# Patient Record
Sex: MALE | Race: WHITE | Employment: PART TIME | URBAN - METROPOLITAN AREA
[De-identification: names, ages, dates, MRNs, and addresses within clinical notes are randomized per-mention and may not be internally consistent; named-entity substitution may affect disease eponyms.]

---

## 2017-04-11 ENCOUNTER — TELEPHONE (OUTPATIENT)
Dept: FAMILY MEDICINE CLINIC | Age: 62
End: 2017-04-11

## 2017-06-15 ENCOUNTER — OFFICE VISIT (OUTPATIENT)
Dept: FAMILY MEDICINE CLINIC | Age: 62
End: 2017-06-15

## 2017-06-15 VITALS
HEART RATE: 76 BPM | SYSTOLIC BLOOD PRESSURE: 120 MMHG | DIASTOLIC BLOOD PRESSURE: 62 MMHG | HEIGHT: 73 IN | WEIGHT: 222.13 LBS | BODY MASS INDEX: 29.44 KG/M2 | RESPIRATION RATE: 16 BRPM

## 2017-06-15 DIAGNOSIS — F41.9 ANXIETY: Primary | ICD-10-CM

## 2017-06-15 DIAGNOSIS — H61.23 BILATERAL IMPACTED CERUMEN: ICD-10-CM

## 2017-06-15 PROCEDURE — 99213 OFFICE O/P EST LOW 20 MIN: CPT | Performed by: FAMILY MEDICINE

## 2017-06-15 PROCEDURE — 69210 REMOVE IMPACTED EAR WAX UNI: CPT | Performed by: FAMILY MEDICINE

## 2017-06-15 RX ORDER — TRAZODONE HYDROCHLORIDE 50 MG/1
50 TABLET ORAL NIGHTLY
Qty: 20 TABLET | Refills: 1 | Status: SHIPPED | OUTPATIENT
Start: 2017-06-15 | End: 2017-06-19 | Stop reason: SDUPTHER

## 2017-06-15 ASSESSMENT — ENCOUNTER SYMPTOMS
SHORTNESS OF BREATH: 0
CONSTIPATION: 0
SINUS PRESSURE: 0

## 2017-06-19 ENCOUNTER — TELEPHONE (OUTPATIENT)
Dept: FAMILY MEDICINE CLINIC | Age: 62
End: 2017-06-19

## 2017-06-19 DIAGNOSIS — F41.9 ANXIETY: ICD-10-CM

## 2017-06-19 RX ORDER — TRAZODONE HYDROCHLORIDE 50 MG/1
100 TABLET ORAL NIGHTLY
COMMUNITY
Start: 2017-06-19 | End: 2017-07-05 | Stop reason: SDUPTHER

## 2017-06-26 ENCOUNTER — TELEPHONE (OUTPATIENT)
Dept: FAMILY MEDICINE CLINIC | Age: 62
End: 2017-06-26

## 2017-07-05 ENCOUNTER — TELEPHONE (OUTPATIENT)
Dept: FAMILY MEDICINE CLINIC | Age: 62
End: 2017-07-05

## 2017-07-05 DIAGNOSIS — F41.9 ANXIETY: ICD-10-CM

## 2017-07-05 RX ORDER — TRAZODONE HYDROCHLORIDE 100 MG/1
100 TABLET ORAL NIGHTLY
Qty: 90 TABLET | Refills: 3 | Status: SHIPPED | OUTPATIENT
Start: 2017-07-05 | End: 2018-06-01 | Stop reason: SDUPTHER

## 2017-10-16 ENCOUNTER — TELEPHONE (OUTPATIENT)
Dept: FAMILY MEDICINE CLINIC | Age: 62
End: 2017-10-16

## 2018-06-01 ENCOUNTER — OFFICE VISIT (OUTPATIENT)
Dept: FAMILY MEDICINE CLINIC | Age: 63
End: 2018-06-01

## 2018-06-01 VITALS
HEIGHT: 73 IN | WEIGHT: 214.25 LBS | SYSTOLIC BLOOD PRESSURE: 108 MMHG | BODY MASS INDEX: 28.39 KG/M2 | HEART RATE: 80 BPM | RESPIRATION RATE: 14 BRPM | DIASTOLIC BLOOD PRESSURE: 70 MMHG

## 2018-06-01 DIAGNOSIS — Z12.5 SCREENING FOR MALIGNANT NEOPLASM OF PROSTATE: ICD-10-CM

## 2018-06-01 DIAGNOSIS — C85.90 NON-HODGKIN'S LYMPHOMA, UNSPECIFIED BODY REGION, UNSPECIFIED NON-HODGKIN LYMPHOMA TYPE (HCC): ICD-10-CM

## 2018-06-01 DIAGNOSIS — H61.23 BILATERAL IMPACTED CERUMEN: ICD-10-CM

## 2018-06-01 DIAGNOSIS — F41.9 ANXIETY: ICD-10-CM

## 2018-06-01 DIAGNOSIS — Z00.00 WELL ADULT EXAM: Primary | ICD-10-CM

## 2018-06-01 DIAGNOSIS — J06.9 UPPER RESPIRATORY TRACT INFECTION, UNSPECIFIED TYPE: ICD-10-CM

## 2018-06-01 DIAGNOSIS — Z12.11 SCREEN FOR COLON CANCER: ICD-10-CM

## 2018-06-01 PROCEDURE — 69210 REMOVE IMPACTED EAR WAX UNI: CPT | Performed by: FAMILY MEDICINE

## 2018-06-01 PROCEDURE — 99396 PREV VISIT EST AGE 40-64: CPT | Performed by: FAMILY MEDICINE

## 2018-06-01 RX ORDER — TRAZODONE HYDROCHLORIDE 100 MG/1
100 TABLET ORAL NIGHTLY
Qty: 90 TABLET | Refills: 3 | Status: SHIPPED | OUTPATIENT
Start: 2018-06-01 | End: 2019-06-11 | Stop reason: SDUPTHER

## 2018-06-01 RX ORDER — AMOXICILLIN AND CLAVULANATE POTASSIUM 875; 125 MG/1; MG/1
1 TABLET, FILM COATED ORAL 2 TIMES DAILY
Qty: 42 TABLET | Refills: 0 | Status: SHIPPED | OUTPATIENT
Start: 2018-06-01 | End: 2018-06-01 | Stop reason: SDUPTHER

## 2018-06-01 RX ORDER — AMOXICILLIN AND CLAVULANATE POTASSIUM 875; 125 MG/1; MG/1
1 TABLET, FILM COATED ORAL 2 TIMES DAILY
Qty: 42 TABLET | Refills: 0 | Status: SHIPPED | OUTPATIENT
Start: 2018-06-01 | End: 2018-06-22

## 2018-06-01 ASSESSMENT — ENCOUNTER SYMPTOMS
SINUS PRESSURE: 0
BACK PAIN: 1
COUGH: 1
CONSTIPATION: 0
SHORTNESS OF BREATH: 0

## 2018-06-01 ASSESSMENT — PATIENT HEALTH QUESTIONNAIRE - PHQ9
SUM OF ALL RESPONSES TO PHQ QUESTIONS 1-9: 0
1. LITTLE INTEREST OR PLEASURE IN DOING THINGS: 0
2. FEELING DOWN, DEPRESSED OR HOPELESS: 0
SUM OF ALL RESPONSES TO PHQ9 QUESTIONS 1 & 2: 0

## 2018-06-09 LAB
ABSOLUTE BASO #: 0 K/UL (ref 0–0.1)
ABSOLUTE EOS #: 0.1 K/UL (ref 0.1–0.4)
ABSOLUTE LYMPH #: 1.5 K/UL (ref 0.8–5.2)
ABSOLUTE MONO #: 0.5 K/UL (ref 0.1–0.9)
ABSOLUTE NEUT #: 3.1 K/UL (ref 1.3–9.1)
ALBUMIN SERPL-MCNC: 4.5 G/DL (ref 3.5–5.2)
ALK PHOSPHATASE: 61 U/L (ref 39–118)
ALT SERPL-CCNC: 33 U/L (ref 5–50)
ANION GAP SERPL CALCULATED.3IONS-SCNC: 11 MEQ/L (ref 10–19)
AST SERPL-CCNC: 29 U/L (ref 9–50)
BASOPHILS RELATIVE PERCENT: 0.8 %
BILIRUB SERPL-MCNC: 0.7 MG/DL
BUN BLDV-MCNC: 14 MG/DL (ref 8–23)
CALCIUM SERPL-MCNC: 9.2 MG/DL (ref 8.5–10.5)
CHLORIDE BLD-SCNC: 103 MEQ/L (ref 95–107)
CHOLESTEROL/HDL RATIO: 1.7
CHOLESTEROL: 100 MG/DL
CO2: 27 MEQ/L (ref 19–31)
CREAT SERPL-MCNC: 0.9 MG/DL (ref 0.8–1.4)
EGFR AFRICAN AMERICAN: 107.2 ML/MIN/1.73 M2
EGFR IF NONAFRICAN AMERICAN: 92.5 ML/MIN/1.73 M2
EOSINOPHILS RELATIVE PERCENT: 2.1 %
GLUCOSE: 104 MG/DL (ref 70–99)
HCT VFR BLD CALC: 40.5 % (ref 41.4–51)
HDLC SERPL-MCNC: 59 MG/DL
HEMOGLOBIN: 14.1 G/DL (ref 13.8–17)
LDL CHOLESTEROL CALCULATED: 36 MG/DL
LDL/HDL RATIO: 0.6
LYMPHOCYTE %: 27.7 %
MCH RBC QN AUTO: 31.6 PG (ref 27–34)
MCHC RBC AUTO-ENTMCNC: 34.8 G/DL (ref 31–36)
MCV RBC AUTO: 90.8 FL (ref 80–100)
MONOCYTES # BLD: 10 %
NEUTROPHILS RELATIVE PERCENT: 59 %
PDW BLD-RTO: 12.6 % (ref 10.8–14.8)
PLATELETS: 146 K/UL (ref 150–450)
POTASSIUM SERPL-SCNC: 4.1 MEQ/L (ref 3.5–5.4)
PSA, ULTRASENSITIVE: 0.77 NG/ML
RBC: 4.46 M/UL (ref 4–5.5)
SODIUM BLD-SCNC: 141 MEQ/L (ref 135–146)
TOTAL PROTEIN: 6.8 G/DL (ref 6.1–8.3)
TRIGL SERPL-MCNC: 25 MG/DL
VLDLC SERPL CALC-MCNC: 5 MG/DL
WBC: 5.3 K/UL (ref 3.7–10.8)

## 2018-06-12 ENCOUNTER — TELEPHONE (OUTPATIENT)
Dept: FAMILY MEDICINE CLINIC | Age: 63
End: 2018-06-12

## 2018-07-31 ENCOUNTER — OFFICE VISIT (OUTPATIENT)
Dept: FAMILY MEDICINE CLINIC | Age: 63
End: 2018-07-31

## 2018-07-31 VITALS
WEIGHT: 220.38 LBS | SYSTOLIC BLOOD PRESSURE: 118 MMHG | RESPIRATION RATE: 14 BRPM | HEART RATE: 84 BPM | DIASTOLIC BLOOD PRESSURE: 64 MMHG | BODY MASS INDEX: 29.21 KG/M2 | HEIGHT: 73 IN

## 2018-07-31 DIAGNOSIS — J06.9 UPPER RESPIRATORY TRACT INFECTION, UNSPECIFIED TYPE: Primary | ICD-10-CM

## 2018-07-31 DIAGNOSIS — L98.9 SCALP LESION: ICD-10-CM

## 2018-07-31 PROCEDURE — 99213 OFFICE O/P EST LOW 20 MIN: CPT | Performed by: FAMILY MEDICINE

## 2018-07-31 RX ORDER — SULFAMETHOXAZOLE AND TRIMETHOPRIM 800; 160 MG/1; MG/1
1 TABLET ORAL 2 TIMES DAILY
Qty: 28 TABLET | Refills: 0 | Status: SHIPPED | OUTPATIENT
Start: 2018-07-31 | End: 2018-08-14

## 2018-07-31 ASSESSMENT — ENCOUNTER SYMPTOMS
COUGH: 1
SHORTNESS OF BREATH: 0
CONSTIPATION: 0
SINUS PRESSURE: 0

## 2018-08-03 NOTE — PROGRESS NOTES
Pt sched to see Dr Monica Brown 8/8/18 at 7 am faxed last ov to them at [phone]
dentist  Use some flonase over the counter

## 2018-12-21 ENCOUNTER — OFFICE VISIT (OUTPATIENT)
Dept: FAMILY MEDICINE CLINIC | Age: 63
End: 2018-12-21

## 2018-12-21 VITALS
RESPIRATION RATE: 12 BRPM | BODY MASS INDEX: 29.09 KG/M2 | WEIGHT: 219.5 LBS | HEART RATE: 60 BPM | HEIGHT: 73 IN | DIASTOLIC BLOOD PRESSURE: 62 MMHG | SYSTOLIC BLOOD PRESSURE: 104 MMHG

## 2018-12-21 DIAGNOSIS — J06.9 UPPER RESPIRATORY TRACT INFECTION, UNSPECIFIED TYPE: Primary | ICD-10-CM

## 2018-12-21 DIAGNOSIS — H61.23 BILATERAL IMPACTED CERUMEN: ICD-10-CM

## 2018-12-21 PROCEDURE — 99213 OFFICE O/P EST LOW 20 MIN: CPT | Performed by: FAMILY MEDICINE

## 2018-12-21 PROCEDURE — 69210 REMOVE IMPACTED EAR WAX UNI: CPT | Performed by: FAMILY MEDICINE

## 2018-12-21 RX ORDER — AZITHROMYCIN 250 MG/1
TABLET, FILM COATED ORAL
Qty: 1 PACKET | Refills: 0 | Status: SHIPPED | OUTPATIENT
Start: 2018-12-21 | End: 2018-12-31

## 2018-12-21 RX ORDER — GUAIFENESIN AND CODEINE PHOSPHATE 100; 10 MG/5ML; MG/5ML
10 SOLUTION ORAL 4 TIMES DAILY PRN
Qty: 150 ML | Refills: 0 | Status: SHIPPED | OUTPATIENT
Start: 2018-12-21 | End: 2018-12-26

## 2019-01-01 ASSESSMENT — ENCOUNTER SYMPTOMS
SHORTNESS OF BREATH: 0
SINUS PRESSURE: 0
CONSTIPATION: 0

## 2019-02-01 ENCOUNTER — HOSPITAL ENCOUNTER (EMERGENCY)
Age: 64
Discharge: HOME | End: 2019-02-01
Attending: EMERGENCY MEDICINE
Payer: COMMERCIAL

## 2019-02-01 ENCOUNTER — APPOINTMENT (OUTPATIENT)
Dept: GENERAL RADIOLOGY | Age: 64
End: 2019-02-01
Payer: COMMERCIAL

## 2019-02-01 VITALS
TEMPERATURE: 97.9 F | SYSTOLIC BLOOD PRESSURE: 139 MMHG | DIASTOLIC BLOOD PRESSURE: 81 MMHG | HEART RATE: 81 BPM | OXYGEN SATURATION: 99 %

## 2019-02-01 DIAGNOSIS — S86.911A STRAIN OF RIGHT KNEE, INITIAL ENCOUNTER: ICD-10-CM

## 2019-02-01 DIAGNOSIS — S86.912A STRAIN OF KNEE, LEFT, INITIAL ENCOUNTER: ICD-10-CM

## 2019-02-01 DIAGNOSIS — S39.012A LUMBAR STRAIN, INITIAL ENCOUNTER: Primary | ICD-10-CM

## 2019-02-01 DIAGNOSIS — M47.896 OTHER OSTEOARTHRITIS OF SPINE, LUMBAR REGION: ICD-10-CM

## 2019-02-01 PROCEDURE — 72100 X-RAY EXAM L-S SPINE 2/3 VWS: CPT

## 2019-02-01 PROCEDURE — 73564 X-RAY EXAM KNEE 4 OR MORE: CPT

## 2019-02-01 PROCEDURE — 6370000000 HC RX 637 (ALT 250 FOR IP): Performed by: EMERGENCY MEDICINE

## 2019-02-01 PROCEDURE — 99284 EMERGENCY DEPT VISIT MOD MDM: CPT

## 2019-02-01 PROCEDURE — 96372 THER/PROPH/DIAG INJ SC/IM: CPT

## 2019-02-01 PROCEDURE — 6360000002 HC RX W HCPCS: Performed by: EMERGENCY MEDICINE

## 2019-02-01 RX ORDER — CYCLOBENZAPRINE HCL 10 MG
10 TABLET ORAL 3 TIMES DAILY PRN
Qty: 21 TABLET | Refills: 0 | Status: SHIPPED | OUTPATIENT
Start: 2019-02-01 | End: 2019-02-08

## 2019-02-01 RX ORDER — OXYCODONE HYDROCHLORIDE AND ACETAMINOPHEN 5; 325 MG/1; MG/1
1 TABLET ORAL EVERY 6 HOURS PRN
Qty: 20 TABLET | Refills: 0 | Status: SHIPPED | OUTPATIENT
Start: 2019-02-01 | End: 2019-02-06

## 2019-02-01 RX ORDER — PREDNISONE 10 MG/1
TABLET ORAL
Qty: 16 TABLET | Refills: 0 | Status: SHIPPED | OUTPATIENT
Start: 2019-02-01 | End: 2019-06-19 | Stop reason: ALTCHOICE

## 2019-02-01 RX ORDER — KETOROLAC TROMETHAMINE 30 MG/ML
30 INJECTION, SOLUTION INTRAMUSCULAR; INTRAVENOUS ONCE
Status: COMPLETED | OUTPATIENT
Start: 2019-02-01 | End: 2019-02-01

## 2019-02-01 RX ORDER — CYCLOBENZAPRINE HCL 10 MG
10 TABLET ORAL ONCE
Status: COMPLETED | OUTPATIENT
Start: 2019-02-01 | End: 2019-02-01

## 2019-02-01 RX ADMIN — KETOROLAC TROMETHAMINE 30 MG: 30 INJECTION, SOLUTION INTRAMUSCULAR at 18:01

## 2019-02-01 RX ADMIN — HYDROMORPHONE HYDROCHLORIDE 1 MG: 1 INJECTION, SOLUTION INTRAMUSCULAR; INTRAVENOUS; SUBCUTANEOUS at 19:52

## 2019-02-01 RX ADMIN — CYCLOBENZAPRINE HYDROCHLORIDE 10 MG: 10 TABLET, FILM COATED ORAL at 18:01

## 2019-02-01 ASSESSMENT — ENCOUNTER SYMPTOMS
EYE REDNESS: 0
BACK PAIN: 1
SORE THROAT: 0
COUGH: 0
SHORTNESS OF BREATH: 0
EYE DISCHARGE: 0
ABDOMINAL PAIN: 0
NAUSEA: 0
DIARRHEA: 0
WHEEZING: 0
RHINORRHEA: 0
VOMITING: 0

## 2019-02-01 ASSESSMENT — PAIN SCALES - GENERAL
PAINLEVEL_OUTOF10: 8

## 2019-02-01 ASSESSMENT — PAIN DESCRIPTION - PAIN TYPE: TYPE: ACUTE PAIN

## 2019-02-01 ASSESSMENT — PAIN DESCRIPTION - LOCATION: LOCATION: BACK

## 2019-02-01 ASSESSMENT — PAIN DESCRIPTION - DESCRIPTORS: DESCRIPTORS: ACHING

## 2019-02-01 ASSESSMENT — PAIN DESCRIPTION - FREQUENCY: FREQUENCY: CONTINUOUS

## 2019-02-05 ENCOUNTER — HOSPITAL ENCOUNTER (OUTPATIENT)
Age: 64
Discharge: HOME | End: 2019-02-05

## 2019-03-29 ENCOUNTER — TELEPHONE (OUTPATIENT)
Dept: FAMILY MEDICINE CLINIC | Age: 64
End: 2019-03-29

## 2019-03-29 NOTE — TELEPHONE ENCOUNTER
Pt called stating he had called [address] regarding his CPAP machine. It is malfunctioning and obsolete. [facility] told him that he needed a RX from his PCP to be able to get a new one. He is asking for this ASAP as it does help him sleep.      [address] [facility]

## 2019-04-01 NOTE — TELEPHONE ENCOUNTER
I looked at the order for CPAP in the system and it asks for several settings of his device. It would be best if he got the order from the sleep doctor himself because over time the settings may need to be adjusted.

## 2019-06-11 DIAGNOSIS — F41.9 ANXIETY: ICD-10-CM

## 2019-06-11 RX ORDER — TRAZODONE HYDROCHLORIDE 100 MG/1
100 TABLET ORAL NIGHTLY
Qty: 90 TABLET | Refills: 3 | Status: SHIPPED | OUTPATIENT
Start: 2019-06-11 | End: 2020-05-28 | Stop reason: SDUPTHER

## 2019-06-11 NOTE — TELEPHONE ENCOUNTER
Prasanth Alves called requesting a refill of the below medication which has been pended for you:     Requested Prescriptions     Pending Prescriptions Disp Refills    traZODone (DESYREL) 100 MG tablet 90 tablet 3     Sig: Take 1 tablet by mouth nightly       Last Appointment Date: 12/21/2018  Next Appointment Date: 6/19/2019    Allergies   Allergen Reactions    Haldol [Haloperidol Lactate]      Made him very restless.     Vicodin [Hydrocodone-Acetaminophen] Rash

## 2019-06-19 ENCOUNTER — OFFICE VISIT (OUTPATIENT)
Dept: FAMILY MEDICINE CLINIC | Age: 64
End: 2019-06-19

## 2019-06-19 VITALS
DIASTOLIC BLOOD PRESSURE: 68 MMHG | HEIGHT: 73 IN | RESPIRATION RATE: 16 BRPM | BODY MASS INDEX: 29.22 KG/M2 | HEART RATE: 78 BPM | WEIGHT: 220.5 LBS | SYSTOLIC BLOOD PRESSURE: 128 MMHG

## 2019-06-19 DIAGNOSIS — Z12.11 SCREEN FOR COLON CANCER: ICD-10-CM

## 2019-06-19 DIAGNOSIS — Z00.00 WELL ADULT EXAM: Primary | ICD-10-CM

## 2019-06-19 DIAGNOSIS — G47.33 OSA (OBSTRUCTIVE SLEEP APNEA): ICD-10-CM

## 2019-06-19 DIAGNOSIS — C85.90 NON-HODGKIN'S LYMPHOMA, UNSPECIFIED BODY REGION, UNSPECIFIED NON-HODGKIN LYMPHOMA TYPE (HCC): ICD-10-CM

## 2019-06-19 DIAGNOSIS — Z12.5 SCREENING FOR MALIGNANT NEOPLASM OF PROSTATE: ICD-10-CM

## 2019-06-19 DIAGNOSIS — E01.0 THYROMEGALY: ICD-10-CM

## 2019-06-19 LAB
HEMOCCULT STL QL: NORMAL

## 2019-06-19 PROCEDURE — 82270 OCCULT BLOOD FECES: CPT | Performed by: FAMILY MEDICINE

## 2019-06-19 PROCEDURE — 99396 PREV VISIT EST AGE 40-64: CPT | Performed by: FAMILY MEDICINE

## 2019-06-19 RX ORDER — SODIUM FLUORIDE 6 MG/ML
PASTE, DENTIFRICE DENTAL
Refills: 4 | COMMUNITY
Start: 2019-06-07

## 2019-06-19 RX ORDER — 1.1% SODIUM FLUORIDE PRESCRIPTION DENTAL CREAM 5 MG/G
CREAM DENTAL
Refills: 5 | COMMUNITY
Start: 2019-03-25 | End: 2021-04-05

## 2019-06-19 ASSESSMENT — ENCOUNTER SYMPTOMS
BACK PAIN: 1
SHORTNESS OF BREATH: 0
SINUS PRESSURE: 0
RHINORRHEA: 1
CONSTIPATION: 0

## 2019-06-19 ASSESSMENT — PATIENT HEALTH QUESTIONNAIRE - PHQ9
1. LITTLE INTEREST OR PLEASURE IN DOING THINGS: 0
SUM OF ALL RESPONSES TO PHQ QUESTIONS 1-9: 0
SUM OF ALL RESPONSES TO PHQ QUESTIONS 1-9: 0
2. FEELING DOWN, DEPRESSED OR HOPELESS: 0
SUM OF ALL RESPONSES TO PHQ9 QUESTIONS 1 & 2: 0

## 2019-06-19 NOTE — PATIENT INSTRUCTIONS
Get the thyroid ultrasound done  Do the fasting labs soon  Continue the flonase and the other OTC product  See me in a year

## 2019-06-19 NOTE — PROGRESS NOTES
pulses. Pulmonary/Chest: Effort normal and breath sounds normal.   Abdominal: Soft. Bowel sounds are normal. He exhibits no mass. Hernia confirmed negative in the right inguinal area and confirmed negative in the left inguinal area. Genitourinary: Rectum normal, prostate normal, testes normal and penis normal. Rectal exam shows guaiac negative stool. Circumcised. Musculoskeletal: He exhibits no edema. Lymphadenopathy:     He has no cervical adenopathy. Neurological: He is alert and oriented to person, place, and time. Skin: Skin is warm and dry. Psychiatric: He has a normal mood and affect. His behavior is normal.   Blood pressure 128/68, pulse 78, resp. rate 16, height 6' 1\" (1.854 m), weight 220 lb 8 oz (100 kg). Results for orders placed or performed in visit on 06/19/19   POCT occult blood stool   Result Value Ref Range    OCCULT BLOOD FECAL      OCCULT BLOOD FECAL      OCCULT BLOOD FECAL       negative  Assessment:       Diagnosis Orders   1. Well adult exam  Lipid, Fasting    Comprehensive Metabolic Panel, Fasting   2. DAHIANA (obstructive sleep apnea)  [facility][note 3][facility]   3. Screening for malignant neoplasm of prostate  Psa screening   4. Non-Hodgkin's lymphoma, unspecified body region, unspecified non-Hodgkin lymphoma type ([facility] [address]  CBC Auto Differential   5. Screen for colon cancer  POCT occult blood stool   6.  Thyromegaly  US THYROID    TSH    T4, Free    T3, Free           Plan:      Get the thyroid ultrasound done  Do the fasting labs soon  Continue the flonase and the other OTC product  See me in a year        Effie Motley MD

## 2019-06-20 ENCOUNTER — TELEPHONE (OUTPATIENT)
Dept: FAMILY MEDICINE CLINIC | Age: 64
End: 2019-06-20

## 2019-07-07 LAB
ABSOLUTE BASO #: 0 K/UL (ref 0–0.1)
ABSOLUTE EOS #: 0.1 K/UL (ref 0.1–0.4)
ABSOLUTE LYMPH #: 1.5 K/UL (ref 0.8–5.2)
ABSOLUTE MONO #: 0.6 K/UL (ref 0.1–0.9)
ABSOLUTE NEUT #: 3.5 K/UL (ref 1.3–9.1)
ALBUMIN SERPL-MCNC: 4.1 G/DL (ref 3.2–5.3)
ALK PHOSPHATASE: 56 U/L (ref 39–130)
ALT SERPL-CCNC: 35 U/L (ref 0–40)
ANION GAP SERPL CALCULATED.3IONS-SCNC: 7 MMOL/L (ref 4–12)
AST SERPL-CCNC: 27 U/L (ref 0–41)
BASOPHILS RELATIVE PERCENT: 0.7 %
BILIRUB SERPL-MCNC: 0.7 MG/DL (ref 0.3–1.2)
BUN BLDV-MCNC: 17 MG/DL (ref 5–27)
CALCIUM SERPL-MCNC: 9 MG/DL (ref 8.5–10.5)
CHLORIDE BLD-SCNC: 107 MMOL/L (ref 98–109)
CHOLESTEROL/HDL RATIO: 1.8 (ref 1–5)
CHOLESTEROL: 91 MG/DL (ref 150–200)
CO2: 28 MMOL/L (ref 22–32)
CREAT SERPL-MCNC: 0.87 MG/DL (ref 0.6–1.3)
EGFR AFRICAN AMERICAN: >60 ML/MIN/1.73SQ.M
EGFR IF NONAFRICAN AMERICAN: >60 ML/MIN/1.73SQ.M
EOSINOPHILS RELATIVE PERCENT: 1.8 %
GLUCOSE: 106 MG/DL (ref 65–99)
HCT VFR BLD CALC: 38.9 % (ref 41.4–51)
HDLC SERPL-MCNC: 51 MG/DL
HEMOGLOBIN: 13.6 G/DL (ref 13.8–17)
LDL CHOLESTEROL CALCULATED: 32 MG/DL
LDL/HDL RATIO: 0.6
LYMPHOCYTE %: 26.8 %
MCH RBC QN AUTO: 32.2 PG (ref 27–34)
MCHC RBC AUTO-ENTMCNC: 35 G/DL (ref 31–36)
MCV RBC AUTO: 92.2 FL (ref 80–100)
MONOCYTES # BLD: 10.2 %
NEUTROPHILS RELATIVE PERCENT: 60.3 %
PDW BLD-RTO: 12.2 % (ref 10.8–14.8)
PLATELETS: 133 K/UL (ref 150–450)
POTASSIUM SERPL-SCNC: 3.8 MMOL/L (ref 3.5–5)
PSA, ULTRASENSITIVE: 0.74 NG/ML (ref 0–4)
RBC: 4.22 M/UL (ref 4–5.5)
SODIUM BLD-SCNC: 142 MMOL/L (ref 134–146)
T3 FREE: 3.87 PG/ML (ref 2.5–3.9)
T4 FREE: 0.88 NG/DL (ref 0.61–1.6)
TOTAL PROTEIN: 6.5 G/DL (ref 6–8)
TRIGL SERPL-MCNC: 38 MG/DL (ref 27–150)
TSH SERPL DL<=0.05 MIU/L-ACNC: 0.99 UIU/ML (ref 0.49–4.67)
VLDLC SERPL CALC-MCNC: 8 MG/DL (ref 0–30)
WBC: 5.7 K/UL (ref 3.7–10.8)

## 2019-07-08 ENCOUNTER — TELEPHONE (OUTPATIENT)
Dept: FAMILY MEDICINE CLINIC | Age: 64
End: 2019-07-08

## 2019-07-08 NOTE — TELEPHONE ENCOUNTER
----- Message from Drea Campos MD sent at 7/7/2019  3:11 PM EDT -----  Let him know the labs looked well and check them again in a year.

## 2019-07-15 ENCOUNTER — HOSPITAL ENCOUNTER (OUTPATIENT)
Dept: ULTRASOUND IMAGING | Age: 64
Discharge: HOME | End: 2019-07-15
Payer: COMMERCIAL

## 2019-07-15 DIAGNOSIS — E01.0 THYROMEGALY: ICD-10-CM

## 2019-07-15 PROCEDURE — 76536 US EXAM OF HEAD AND NECK: CPT

## 2019-07-16 ENCOUNTER — TELEPHONE (OUTPATIENT)
Dept: FAMILY MEDICINE CLINIC | Age: 64
End: 2019-07-16

## 2019-07-16 DIAGNOSIS — E04.2 MULTINODULAR GOITER: Primary | ICD-10-CM

## 2019-08-06 ENCOUNTER — INITIAL CONSULT (OUTPATIENT)
Dept: PULMONOLOGY | Age: 64
End: 2019-08-06
Payer: MEDICARE

## 2019-08-06 ENCOUNTER — TELEPHONE (OUTPATIENT)
Dept: PULMONOLOGY | Age: 64
End: 2019-08-06

## 2019-08-06 VITALS
SYSTOLIC BLOOD PRESSURE: 137 MMHG | DIASTOLIC BLOOD PRESSURE: 85 MMHG | HEART RATE: 76 BPM | HEIGHT: 73 IN | BODY MASS INDEX: 28.86 KG/M2 | WEIGHT: 217.8 LBS | OXYGEN SATURATION: 97 %

## 2019-08-06 DIAGNOSIS — G47.10 HYPERSOMNIA: ICD-10-CM

## 2019-08-06 DIAGNOSIS — G47.9 SLEEP DISORDER: Primary | ICD-10-CM

## 2019-08-06 DIAGNOSIS — G47.33 OSA (OBSTRUCTIVE SLEEP APNEA): Primary | ICD-10-CM

## 2019-08-06 PROCEDURE — 99203 OFFICE O/P NEW LOW 30 MIN: CPT | Performed by: INTERNAL MEDICINE

## 2019-08-06 RX ORDER — ZOLPIDEM TARTRATE 5 MG/1
TABLET ORAL
Qty: 1 TABLET | Refills: 0 | Status: SHIPPED | OUTPATIENT
Start: 2019-08-06 | End: 2019-09-06

## 2019-08-06 NOTE — PROGRESS NOTES
1.1 % PSTE USE SMALL AMOUNT ONCE DAILY AS A TOOTHPASTE  4    traZODone (DESYREL) 100 MG tablet Take 1 tablet by mouth nightly 90 tablet 3    therapeutic multivitamin-minerals (THERAGRAN-M) tablet Take 1 tablet by mouth daily.  SF 5000 PLUS 1.1 % CREA   5    Olive Leaf Extract 500 MG CAPS Take  by mouth. No current facility-administered medications for this visit. Family History   Problem Relation Age of Onset    Cancer Mother         bladder    Heart Disease Mother     Stroke Mother         Any family history of any sleep problems or any one in your family on CPAP? Yes    Caffeine Intake: How much soda (pop), coffee, tea, power drinks do you ingest per day?0 per day. 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 20-25 h a week, no calls      Physical Exam:    HEIGHTHeight: 6' 1\" (185.4 cm) WEIGHTWeight: 217 lb 12.8 oz (98.8 kg)    BMI:  Body mass index is 28.74 kg/m². Neck Size: 18  Oxygen Sat: room air    ESS: 10   Vitals: /85 (Site: Right Upper Arm, Position: Sitting, Cuff Size: Large Adult)   Pulse 76   Ht 6' 1\" (1.854 m)   Wt 217 lb 12.8 oz (98.8 kg)   SpO2 97% Comment: on RA  BMI 28.74 kg/m²       Mallampati Score: 2    General appearance: alert and oriented to person, place and time, well-developed and well-nourished, in no acute distress  Nose: Nares normal. Septum midline. Mucosa normal. No drainage or sinus tenderness.   Oropharynx:  Mallampati class 2  Lungs: clear to auscultation bilaterally  Heart: regular rate and rhythm, S1, S2 normal, no murmur, click, rub or gallop  Extremities: extremities normal, atraumatic, no cyanosis or edema  Neurologic: Mental status: Alert, oriented, thought content appropriate      Assessment      Obstructive sleep apnea on obsolete non downloadable Bipap 14/10, needs a new device  Uses device every night but remains symptomatic with ESS 10 cwp results last

## 2019-08-06 NOTE — TELEPHONE ENCOUNTER
Patient stated when he had his last sleep study he had to be given something to help him sleep. He is requesting something to take again on the night of the sleep study.

## 2019-08-27 NOTE — TELEPHONE ENCOUNTER
Pt called said that he called Dr. Debbie Childers' office and he was told that they did not have the referral or the medical records. He gave me the same fax # of [phone] so I faxed the referral, the office visit from 06/19/19, the ultra sound result as well as a copy of the pt's [employer] card.

## 2019-09-10 ENCOUNTER — TELEPHONE (OUTPATIENT)
Dept: PULMONOLOGY | Age: 64
End: 2019-09-10

## 2019-09-10 NOTE — TELEPHONE ENCOUNTER
[facility]awn[facility] called stating they need a per to per for patients sleep study by the 18th of this month. Number is [phone].  Please advise

## 2019-09-11 ENCOUNTER — TELEPHONE (OUTPATIENT)
Dept: SLEEP CENTER | Age: 64
End: 2019-09-11

## 2019-09-16 DIAGNOSIS — G47.33 OSA (OBSTRUCTIVE SLEEP APNEA): Primary | ICD-10-CM

## 2019-09-17 ENCOUNTER — TELEPHONE (OUTPATIENT)
Dept: PULMONOLOGY | Age: 64
End: 2019-09-17

## 2019-09-18 ENCOUNTER — TELEPHONE (OUTPATIENT)
Dept: SLEEP CENTER | Age: 64
End: 2019-09-18

## 2019-09-18 ENCOUNTER — TELEPHONE (OUTPATIENT)
Dept: PULMONOLOGY | Age: 64
End: 2019-09-18

## 2019-09-18 DIAGNOSIS — G47.33 OSA TREATED WITH BIPAP: Primary | ICD-10-CM

## 2019-09-18 NOTE — TELEPHONE ENCOUNTER
Normal called needs an order for [facility] with [facility], Dr. Margarita Wallace is out and his follow up if with you please advise.

## 2019-09-24 ENCOUNTER — TELEPHONE (OUTPATIENT)
Dept: PULMONOLOGY | Age: 64
End: 2019-09-24

## 2019-10-07 DIAGNOSIS — G47.33 OSA (OBSTRUCTIVE SLEEP APNEA): Primary | ICD-10-CM

## 2019-12-20 ENCOUNTER — TELEPHONE (OUTPATIENT)
Dept: FAMILY MEDICINE CLINIC | Age: 64
End: 2019-12-20

## 2020-02-03 ENCOUNTER — OFFICE VISIT (OUTPATIENT)
Dept: PULMONOLOGY | Age: 65
End: 2020-02-03
Payer: MEDICARE

## 2020-02-03 VITALS
HEIGHT: 73 IN | HEART RATE: 98 BPM | SYSTOLIC BLOOD PRESSURE: 144 MMHG | RESPIRATION RATE: 16 BRPM | WEIGHT: 223.8 LBS | OXYGEN SATURATION: 98 % | DIASTOLIC BLOOD PRESSURE: 84 MMHG | BODY MASS INDEX: 29.66 KG/M2

## 2020-02-03 PROCEDURE — 99213 OFFICE O/P EST LOW 20 MIN: CPT | Performed by: NURSE PRACTITIONER

## 2020-02-03 NOTE — PROGRESS NOTES
kg)     Weight stable / unchanged  Vitals: BP (!) 144/84 (Site: Left Upper Arm, Position: Sitting, Cuff Size: Medium Adult)   Pulse 98   Resp 16   Ht 6' 1\" (1.854 m)   Wt 223 lb 12.8 oz (101.5 kg)   SpO2 98% Comment: on RA  BMI 29.53 kg/m²         General Appearance - Moderately built, moderately nourished, in no acute distress. HEENT - Head is normocephalic, atraumatic. PERRL. Oral mucosa pink and moist, no oral thrush. Mallampati Score - II (soft palate, uvula, fauces visible). Neck - Supple, symmetrical, trachea midline and soft. Lungs - Clear to auscultation, no wheezes, rales or rhonchi, aeration good. Cardiovascular - Heart sounds are normal. Regular rhythm normal rate without murmur, gallop or rub. Abdomen - Soft, nontender, non-distended. Neurologic - Alert and oriented x 3. Skin - No bruising or bleeding. Extremities - No cyanosis, clubbing or edema. ASSESSMENT/DIAGNOSIS     Diagnosis Orders   1. DAHIANA treated with BiPAP     2. Insomnia, unspecified type              Plan   Do you need any equipment today? No.    -Trazodone per PCP. - He was advised to continue current positive airway pressure therapy with above described pressure. - He was advised to keep good compliance with current recommended pressure to get optimal results and clinical improvement.  - Recommend 7-9 hours of sleep with PAP treatment. - He was advised to call [facility] regarding supplies if needed.   -He is to call my office for earlier appointment if needed for worsening of sleep symptoms.   - He was instructed on weight loss. Scooby Pearson was educated about my impression and plan and verbalizes understanding. We will see Marquise Pozo back in: 1 year with download.     EARL Garza CNP  2/3/2020 9:46 AM

## 2020-05-28 RX ORDER — TRAZODONE HYDROCHLORIDE 100 MG/1
100 TABLET ORAL NIGHTLY
Qty: 90 TABLET | Refills: 3 | Status: SHIPPED | OUTPATIENT
Start: 2020-05-28 | End: 2021-05-28 | Stop reason: SDUPTHER

## 2020-07-05 ASSESSMENT — ENCOUNTER SYMPTOMS
SINUS PRESSURE: 0
CONSTIPATION: 0
SHORTNESS OF BREATH: 0

## 2020-07-05 NOTE — PROGRESS NOTES
Subjective:      Patient ID: Karlo Rendon is a 60 y.o. male. HPI  Well Adult Physical   Patient here for a comprehensive physical exam.The patient reports problems - we discussed how he saw the GI in [facility]  Do you take any herbs or supplements that were not prescribed by a doctor? yes Are you taking calcium supplements? no Are you taking aspirin daily? no   History:  Patient receives prostate care at our clinic  Date last prostate exam: June/2019  Date last PSA: July/2019    Review of Systems   Constitutional: Negative for fatigue. HENT: Negative for sinus pressure. Eyes: Negative for visual disturbance. Respiratory: Negative for shortness of breath. Cardiovascular: Negative for chest pain. Gastrointestinal: Negative for constipation. Genitourinary: Negative. Musculoskeletal: Negative for arthralgias. Skin: Negative for rash. Neurological: Negative for headaches. The patient's medications, allergies, past medical problems, surgical, social, and family histories were reviewed and updated as needed. Objective:   Physical Exam  Constitutional:       General: He is not in acute distress. Appearance: He is well-developed. HENT:      Head: Normocephalic and atraumatic. Right Ear: External ear normal.      Left Ear: External ear normal.      Nose: Nose normal.      Mouth/Throat:      Pharynx: No oropharyngeal exudate. Eyes:      General: No scleral icterus. Conjunctiva/sclera: Conjunctivae normal.   Neck:      Musculoskeletal: Neck supple. Thyroid: No thyromegaly. Vascular: No carotid bruit. Trachea: No tracheal deviation. Cardiovascular:      Rate and Rhythm: Normal rate and regular rhythm. Heart sounds: Normal heart sounds. Pulmonary:      Effort: Pulmonary effort is normal.      Breath sounds: Normal breath sounds. Abdominal:      General: Bowel sounds are normal.      Palpations: Abdomen is soft. There is no mass.       Hernia: There is no hernia in the left inguinal area. Genitourinary:     Penis: Normal and circumcised. Scrotum/Testes: Normal.      Prostate: Normal.      Rectum: Normal.   Lymphadenopathy:      Cervical: No cervical adenopathy. Skin:     General: Skin is warm and dry. Neurological:      Mental Status: He is alert and oriented to person, place, and time. Psychiatric:         Behavior: Behavior normal.     Blood pressure 134/70, pulse 80, temperature 98.9 °F (37.2 °C), temperature source Oral, resp. rate 14, height 6' 2\" (1.88 m), weight 217 lb 6 oz (98.6 kg). Assessment:       Diagnosis Orders   1. Well adult exam     2. Non-Hodgkin's lymphoma, unspecified body region, unspecified non-Hodgkin lymphoma type ([facility] [address]  Comprehensive Metabolic Panel, Fasting    CBC Auto Differential   3. Screen for colon cancer     4.  Multinodular goiter, 2002  TSH   5. Screening for malignant neoplasm of prostate  Psa screening           Plan:      See me in a year  Do the non fasting labs soon        Toshia Abraham MD

## 2020-07-06 ENCOUNTER — OFFICE VISIT (OUTPATIENT)
Dept: FAMILY MEDICINE CLINIC | Age: 65
End: 2020-07-06

## 2020-07-06 VITALS
RESPIRATION RATE: 14 BRPM | SYSTOLIC BLOOD PRESSURE: 134 MMHG | HEIGHT: 74 IN | TEMPERATURE: 98.9 F | BODY MASS INDEX: 27.9 KG/M2 | HEART RATE: 80 BPM | WEIGHT: 217.38 LBS | DIASTOLIC BLOOD PRESSURE: 70 MMHG

## 2020-07-06 PROBLEM — E04.2 MULTINODULAR GOITER: Status: ACTIVE | Noted: 2019-10-28

## 2020-07-06 PROCEDURE — 99396 PREV VISIT EST AGE 40-64: CPT | Performed by: FAMILY MEDICINE

## 2021-01-12 ENCOUNTER — TELEPHONE (OUTPATIENT)
Dept: FAMILY MEDICINE CLINIC | Age: 66
End: 2021-01-12

## 2021-01-12 NOTE — TELEPHONE ENCOUNTER
Dr Angelina Correa ([phone]) - total thyroidectomy on 2/10/21. They will send us the orders for the pre-op testing. Pt would like a Monday if possible.

## 2021-01-12 NOTE — TELEPHONE ENCOUNTER
Pt called stating that his surgery has been moved to 01/21/21. He is needing to know if he can get in for pre-op clearance ASAP.

## 2021-01-13 ENCOUNTER — HOSPITAL ENCOUNTER (OUTPATIENT)
Age: 66
Discharge: HOME | End: 2021-01-13
Payer: MEDICARE

## 2021-01-13 LAB
ALBUMIN SERPL-MCNC: 4.4 G/DL (ref 3.5–5.1)
ALP BLD-CCNC: 56 U/L (ref 38–126)
ALT SERPL-CCNC: 48 U/L (ref 11–66)
ANION GAP SERPL CALCULATED.3IONS-SCNC: 10 MEQ/L (ref 8–16)
AST SERPL-CCNC: 33 U/L (ref 5–40)
BASOPHILS # BLD: 0.4 %
BASOPHILS ABSOLUTE: 0 THOU/MM3 (ref 0–0.1)
BILIRUB SERPL-MCNC: 0.8 MG/DL (ref 0.3–1.2)
BUN BLDV-MCNC: 16 MG/DL (ref 7–22)
CALCIUM SERPL-MCNC: 9.8 MG/DL (ref 8.5–10.5)
CHLORIDE BLD-SCNC: 104 MEQ/L (ref 98–111)
CO2: 27 MEQ/L (ref 23–33)
CREAT SERPL-MCNC: 0.7 MG/DL (ref 0.4–1.2)
EKG ATRIAL RATE: 79 BPM
EKG P AXIS: 79 DEGREES
EKG P-R INTERVAL: 156 MS
EKG Q-T INTERVAL: 394 MS
EKG QRS DURATION: 94 MS
EKG QTC CALCULATION (BAZETT): 451 MS
EKG R AXIS: 77 DEGREES
EKG T AXIS: -7 DEGREES
EKG VENTRICULAR RATE: 79 BPM
EOSINOPHIL # BLD: 1.1 %
EOSINOPHILS ABSOLUTE: 0.1 THOU/MM3 (ref 0–0.4)
ERYTHROCYTE [DISTWIDTH] IN BLOOD BY AUTOMATED COUNT: 12.5 % (ref 11.5–14.5)
ERYTHROCYTE [DISTWIDTH] IN BLOOD BY AUTOMATED COUNT: 43 FL (ref 35–45)
GFR SERPL CREATININE-BSD FRML MDRD: > 90 ML/MIN/1.73M2
GLUCOSE BLD-MCNC: 122 MG/DL (ref 70–108)
HCT VFR BLD CALC: 40.8 % (ref 42–52)
HEMOGLOBIN: 14 GM/DL (ref 14–18)
IMMATURE GRANS (ABS): 0.02 THOU/MM3 (ref 0–0.07)
IMMATURE GRANULOCYTES: 0.4 %
LYMPHOCYTES # BLD: 25.2 %
LYMPHOCYTES ABSOLUTE: 1.3 THOU/MM3 (ref 1–4.8)
MCH RBC QN AUTO: 32.4 PG (ref 26–33)
MCHC RBC AUTO-ENTMCNC: 34.3 GM/DL (ref 32.2–35.5)
MCV RBC AUTO: 94.4 FL (ref 80–94)
MONOCYTES # BLD: 9.9 %
MONOCYTES ABSOLUTE: 0.5 THOU/MM3 (ref 0.4–1.3)
NUCLEATED RED BLOOD CELLS: 0 /100 WBC
PLATELET # BLD: 136 THOU/MM3 (ref 130–400)
PMV BLD AUTO: 9 FL (ref 9.4–12.4)
POTASSIUM SERPL-SCNC: 3.6 MEQ/L (ref 3.5–5.2)
RBC # BLD: 4.32 MILL/MM3 (ref 4.7–6.1)
SEG NEUTROPHILS: 63 %
SEGMENTED NEUTROPHILS ABSOLUTE COUNT: 3.3 THOU/MM3 (ref 1.8–7.7)
SODIUM BLD-SCNC: 141 MEQ/L (ref 135–145)
TOTAL PROTEIN: 7 G/DL (ref 6.1–8)
WBC # BLD: 5.2 THOU/MM3 (ref 4.8–10.8)

## 2021-01-13 PROCEDURE — 80053 COMPREHEN METABOLIC PANEL: CPT

## 2021-01-13 PROCEDURE — 93005 ELECTROCARDIOGRAM TRACING: CPT | Performed by: SURGERY

## 2021-01-13 PROCEDURE — 36415 COLL VENOUS BLD VENIPUNCTURE: CPT

## 2021-01-13 PROCEDURE — 85025 COMPLETE CBC W/AUTO DIFF WBC: CPT

## 2021-01-19 ENCOUNTER — OFFICE VISIT (OUTPATIENT)
Dept: FAMILY MEDICINE CLINIC | Age: 66
End: 2021-01-19

## 2021-01-19 VITALS
HEART RATE: 84 BPM | SYSTOLIC BLOOD PRESSURE: 138 MMHG | BODY MASS INDEX: 28.13 KG/M2 | HEIGHT: 74 IN | WEIGHT: 219.19 LBS | RESPIRATION RATE: 18 BRPM | TEMPERATURE: 97.2 F | DIASTOLIC BLOOD PRESSURE: 78 MMHG

## 2021-01-19 DIAGNOSIS — H61.23 BILATERAL IMPACTED CERUMEN: ICD-10-CM

## 2021-01-19 DIAGNOSIS — Z01.818 PRE-OP EVALUATION: Primary | ICD-10-CM

## 2021-01-19 PROCEDURE — 69210 REMOVE IMPACTED EAR WAX UNI: CPT | Performed by: FAMILY MEDICINE

## 2021-01-19 PROCEDURE — 99214 OFFICE O/P EST MOD 30 MIN: CPT | Performed by: FAMILY MEDICINE

## 2021-01-19 SDOH — ECONOMIC STABILITY: FOOD INSECURITY: WITHIN THE PAST 12 MONTHS, THE FOOD YOU BOUGHT JUST DIDN'T LAST AND YOU DIDN'T HAVE MONEY TO GET MORE.: NEVER TRUE

## 2021-01-19 SDOH — ECONOMIC STABILITY: INCOME INSECURITY: HOW HARD IS IT FOR YOU TO PAY FOR THE VERY BASICS LIKE FOOD, HOUSING, MEDICAL CARE, AND HEATING?: NOT HARD AT ALL

## 2021-01-19 SDOH — ECONOMIC STABILITY: FOOD INSECURITY: WITHIN THE PAST 12 MONTHS, YOU WORRIED THAT YOUR FOOD WOULD RUN OUT BEFORE YOU GOT MONEY TO BUY MORE.: NEVER TRUE

## 2021-01-19 SDOH — ECONOMIC STABILITY: TRANSPORTATION INSECURITY
IN THE PAST 12 MONTHS, HAS LACK OF TRANSPORTATION KEPT YOU FROM MEETINGS, WORK, OR FROM GETTING THINGS NEEDED FOR DAILY LIVING?: NO

## 2021-01-19 ASSESSMENT — PATIENT HEALTH QUESTIONNAIRE - PHQ9
SUM OF ALL RESPONSES TO PHQ QUESTIONS 1-9: 0

## 2021-01-19 ASSESSMENT — ENCOUNTER SYMPTOMS
CONSTIPATION: 0
SHORTNESS OF BREATH: 0
SINUS PRESSURE: 0

## 2021-01-19 NOTE — TELEPHONE ENCOUNTER
Pt still has not called back to schedule appt for preop. I called pt to schedule.    pt now scheduled for preop

## 2021-01-19 NOTE — PROGRESS NOTES
 therapeutic multivitamin-minerals (THERAGRAN-M) tablet Take 1 tablet by mouth daily.  Olive Leaf Extract 500 MG CAPS Take  by mouth.  PREVIDENT 5000 BOOSTER PLUS 1.1 % PSTE USE SMALL AMOUNT ONCE DAILY AS A TOOTHPASTE  4     No current facility-administered medications for this visit. Social History     Socioeconomic History    Marital status:      Spouse name: Not on file    Number of children: Not on file    Years of education: Not on file    Highest education level: Not on file   Occupational History    Not on file   Social Needs    Financial resource strain: Not hard at all   [employer] insecurity     Worry: Never true     Inability: Never true   [employer] needs     Medical: No     Non-medical: No   Tobacco Use    Smoking status: Never Smoker    Smokeless tobacco: Never Used   Substance and Sexual Activity    Alcohol use:  Yes     Alcohol/week: 0.0 standard drinks     Comment: occasionally    Drug use: No    Sexual activity: Yes   Lifestyle    Physical activity     Days per week: Not on file     Minutes per session: Not on file    Stress: Not on file   Relationships    Social connections     Talks on phone: Not on file     Gets together: Not on file     Attends Oriental orthodox service: Not on file     Active member of club or organization: Not on file     Attends meetings of clubs or organizations: Not on file     Relationship status: Not on file    Intimate partner violence     Fear of current or ex partner: Not on file     Emotionally abused: Not on file     Physically abused: Not on file     Forced sexual activity: Not on file   Other Topics Concern    Not on file   Social History Narrative    Not on file     Past Surgical History:   Procedure Laterality Date    BICEPS TENDON REPAIR  2012    reattachment   [id number] [facility]    ROTATOR CUFF REPAIR  2012    right    TONSILLECTOMY Physical Exam  Constitutional:       General: He is not in acute distress. Appearance: He is well-developed. HENT:      Head: Normocephalic and atraumatic. Right Ear: External ear normal.      Left Ear: External ear normal.      Ears:      Comments: Bilateral cerumen impaction. Wax was removed from the affected ear(s) by myself, using the curette. Examination of the canal(s) by myself showed no sign of injury afterward. Nose: Nose normal.      Mouth/Throat:      Pharynx: No oropharyngeal exudate. Eyes:      General: No scleral icterus. Conjunctiva/sclera: Conjunctivae normal.   Neck:      Musculoskeletal: Neck supple. Thyroid: Thyromegaly present. Vascular: No carotid bruit. Trachea: No tracheal deviation. Cardiovascular:      Rate and Rhythm: Normal rate and regular rhythm. Heart sounds: Normal heart sounds. Pulmonary:      Effort: Pulmonary effort is normal.      Breath sounds: Normal breath sounds. Abdominal:      General: Bowel sounds are normal.      Palpations: Abdomen is soft. There is no mass. Lymphadenopathy:      Cervical: No cervical adenopathy. Skin:     General: Skin is warm and dry. Neurological:      Mental Status: He is alert and oriented to person, place, and time. Psychiatric:         Behavior: Behavior normal.     Blood pressure 138/78, pulse 84, temperature 97.2 °F (36.2 °C), temperature source Skin, resp. rate 18, height 6' 2\" (1.88 m), weight 219 lb 3 oz (99.4 kg). An electronic signature was used to authenticate this note.     --Augie العراقي MD

## 2021-02-22 ENCOUNTER — OFFICE VISIT (OUTPATIENT)
Dept: PULMONOLOGY | Age: 66
End: 2021-02-22
Payer: MEDICARE

## 2021-02-22 VITALS
OXYGEN SATURATION: 98 % | DIASTOLIC BLOOD PRESSURE: 64 MMHG | HEART RATE: 88 BPM | BODY MASS INDEX: 28.23 KG/M2 | TEMPERATURE: 97.8 F | SYSTOLIC BLOOD PRESSURE: 128 MMHG | HEIGHT: 74 IN | WEIGHT: 220 LBS

## 2021-02-22 DIAGNOSIS — G47.00 INSOMNIA, UNSPECIFIED TYPE: ICD-10-CM

## 2021-02-22 DIAGNOSIS — G47.33 OSA TREATED WITH BIPAP: Primary | ICD-10-CM

## 2021-02-22 PROCEDURE — 99212 OFFICE O/P EST SF 10 MIN: CPT | Performed by: NURSE PRACTITIONER

## 2021-02-22 NOTE — PROGRESS NOTES
[facility] and [address] [place] [address] Drive         [phone]  2/22/2021   Chief Complaint   Patient presents with    Follow-up     1yr sleep f/u, DAHIANA, Heather CHEW. Pt of Dr. Tor Pereira    PAP Download:   Abel Folds or initial AHI: 27.4   Date of initial study: 1/4/05  Weight of initial study: 196 lb. [x] Compliant  80%   [] Noncompliant 10%     PAP Type Aircurve VAuto Level BiPAP 14/10 cmH2O   Avg Hrs/Day: 7hrs 15min   AHI: 0.2   Leak: 10.1  Recorded compliance dates: 1/20/21-2/18/21   Machine/Mfg: ResMed  Interface: ffm    Provider:  []-HMRAKAN  []Kimberly []Drew  [x]Heather         [][facility] []Other:     Neck Size: 18  Mallampati: Mallampati 2  ESS:  4  SAQLI: 88    Here is a scan of the most recent download:              Presentation:   Andre Han presents for sleep medicine follow up for obstructive sleep apnea, insomnia. Since the last visit, Andre Han continues to do well with treatment, no complaints. Continues Trazodone to aid with sleep, per PCP. Equipment issues: The pressure is acceptable, the mask is acceptable and he is  using the humidity. Sleep issues:  Do you feel better? Yes. More rested? Yes. Better concentration? yes. Progress History:   Since last visit any new medical issues? No.  New ER or hospitlal visits? No.  Any new or changes in medicines? No.  Any new sleep medicines?  No.      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    Multinodular goiter 2002    Non Hodgkin's lymphoma ([address] 1993    DAHIANA treated with BiPAP     PE (pulmonary embolism) 2012    Reflux esophagitis 1998    Renal cyst 1996    bilateral    Shingles          Past Surgical History:   Procedure Laterality Date    BICEPS TENDON REPAIR  2012    reattachment    BRAIN BIOPSY  1993    LUNG BIOPSY  1993    OTHER SURGICAL HISTORY  1993    lexie filter    ROTATOR CUFF REPAIR  2012    right    TONSILLECTOMY           Social History     Tobacco Use    Smoking status: Never Smoker    Smokeless tobacco: Never Used   Substance Use Topics    Alcohol use: Yes     Alcohol/week: 0.0 standard drinks     Comment: occasionally    Drug use: No       Family History   Problem Relation Age of Onset    Cancer Mother         bladder    Heart Disease Mother     Stroke Mother          Allergies   Allergen Reactions    Haldol [Haloperidol Lactate]      Made him very restless.  Vicodin [Hydrocodone-Acetaminophen] Rash         Current Outpatient Medications   Medication Sig Dispense Refill    traZODone (DESYREL) 100 MG tablet Take 1 tablet by mouth nightly 90 tablet 3    PREVIDENT 5000 BOOSTER PLUS 1.1 % PSTE USE SMALL AMOUNT ONCE DAILY AS A TOOTHPASTE  4    SF 5000 PLUS 1.1 % CREA   5    therapeutic multivitamin-minerals (THERAGRAN-M) tablet Take 1 tablet by mouth daily.  Olive Leaf Extract 500 MG CAPS Take  by mouth. No current facility-administered medications for this visit. Review of Systems   General/Constitutional: No recent loss of weight or appetite changes. No fever or chills. HENT: Negative. Eyes: Negative. Upper respiratory tract: No nasal stuffiness or post nasal drip. Lower respiratory tract/ lungs: No cough or sputum production. No hemoptysis. Cardiovascular: No palpitations or chest pain. Gastrointestinal: No nausea or vomiting. Neurological: No focal neurologiacal weakness. Extremities: No increased edema. Musculoskeletal: No new complaints. Genitourinary: No complaints. Hematological: Negative. Psychiatric/Behavioral: Negative. Skin: No itching. Physical Exam:    BMI: Body mass index is 28.25 kg/m².     Wt Readings from Last 3 Encounters:   02/22/21 220 lb (99.8 kg)   01/19/21 219 lb 3 oz (99.4 kg)   07/06/20 217 lb 6 oz (98.6 kg)     Weight: stable / unchanged over the last year (gain 24 lbs since study)  Vitals: /64 (Site: Left Upper Arm, Position: Sitting, Cuff Size: Medium Adult)   Pulse 88   Temp 97.8 °F (36.6 °C) (Temporal)   Ht 6' 2\" (1.88 m)   Wt 220 lb (99.8 kg)   SpO2 98% Comment: r/a  BMI 28.25 kg/m²         General Appearance - Moderately built, moderately nourished, in no acute distress. HEENT - Head is normocephalic, atraumatic. PERRL. Oral mucosa pink and moist, no oral thrush. Mallampati Score - II (soft palate, uvula, fauces visible). Neck - Supple, symmetrical, trachea midline and soft. Lungs - Clear to auscultation, no wheezes, rales or rhonchi, aeration good. Cardiovascular - Heart sounds are normal. Regular rhythm normal rate without murmur, gallop or rub. Abdomen - Soft, nontender, non-distended. Neurologic - Alert and oriented x 3. Skin - No bruising or bleeding. Extremities - No cyanosis, clubbing or edema. ASSESSMENT/DIAGNOSIS     Diagnosis Orders   1. DAHIANA treated with BiPAP  DME Order for CPAP as OP   2. Insomnia, unspecified type              Plan     Do you need any equipment today? Yes Rx for new supplies.  -Download reviewed, doing well. -Trazodone per PCP. - He was advised to continue current positive airway pressure therapy with above described pressure. - He was advised to keep good compliance with current recommended pressure to get optimal results and clinical improvement.  - Educated on the health risks with untreated sleep apnea. - Recommend 7-9 hours of sleep with PAP treatment. - Re-educated on proper sleep hygiene. - He was advised to call [employer] company regarding supplies if needed. - He is to call my office for earlier appointment if needed for worsening of sleep symptoms.   - He was instructed on weight loss. Devi Singh was educated about my impression and plan and verbalizes understanding.     We will see Pascual Cabello back in: 1 year with download.       Electronically signed by EARL Arce CNP on 2/23/2021 at 9:22 AM

## 2021-03-29 ENCOUNTER — HOSPITAL ENCOUNTER (EMERGENCY)
Age: 66
Discharge: HOME | End: 2021-03-29
Payer: MEDICARE

## 2021-03-29 VITALS
DIASTOLIC BLOOD PRESSURE: 82 MMHG | WEIGHT: 241 LBS | TEMPERATURE: 97.8 F | HEIGHT: 74 IN | HEART RATE: 80 BPM | OXYGEN SATURATION: 97 % | BODY MASS INDEX: 30.93 KG/M2 | SYSTOLIC BLOOD PRESSURE: 151 MMHG | RESPIRATION RATE: 20 BRPM

## 2021-03-29 DIAGNOSIS — S01.311A LACERATION OF RIGHT EAR, INITIAL ENCOUNTER: Primary | ICD-10-CM

## 2021-03-29 PROCEDURE — 99213 OFFICE O/P EST LOW 20 MIN: CPT

## 2021-03-29 PROCEDURE — 12011 RPR F/E/E/N/L/M 2.5 CM/<: CPT | Performed by: NURSE PRACTITIONER

## 2021-03-29 PROCEDURE — 6370000000 HC RX 637 (ALT 250 FOR IP): Performed by: NURSE PRACTITIONER

## 2021-03-29 PROCEDURE — 99213 OFFICE O/P EST LOW 20 MIN: CPT | Performed by: NURSE PRACTITIONER

## 2021-03-29 RX ORDER — LEVOTHYROXINE SODIUM 0.15 MG/1
150 TABLET ORAL DAILY
COMMUNITY

## 2021-03-29 RX ORDER — DIAPER,BRIEF,INFANT-TODD,DISP
1 EACH MISCELLANEOUS ONCE
Status: COMPLETED | OUTPATIENT
Start: 2021-03-29 | End: 2021-03-29

## 2021-03-29 RX ORDER — LIDOCAINE HYDROCHLORIDE 10 MG/ML
INJECTION, SOLUTION EPIDURAL; INFILTRATION; INTRACAUDAL; PERINEURAL
Status: DISCONTINUED
Start: 2021-03-29 | End: 2021-03-29 | Stop reason: HOSPADM

## 2021-03-29 RX ADMIN — BACITRACIN ZINC 1 G: 500 OINTMENT TOPICAL at 14:31

## 2021-03-29 ASSESSMENT — ENCOUNTER SYMPTOMS
SHORTNESS OF BREATH: 0
VOMITING: 0
NAUSEA: 0
COUGH: 0

## 2021-03-29 NOTE — ED PROVIDER NOTES
[facility]  Urgent Care Encounter       CHIEF COMPLAINT       Chief Complaint   Patient presents with    Laceration       Nurses Notes reviewed and I agree except as noted in the HPI. HISTORY OF PRESENT ILLNESS   Stacy Mistry is a 63 y.o. male who presents for evaluation of a laceration to the right ear. Patient states that roughly 1 hour before arrival at the urgent care he was scratching his ear when he began to notice bleeding and noticed that he had a laceration. States that he was able to get bleeding controlled with direct pressure. The history is provided by the patient. REVIEW OF SYSTEMS     Review of Systems   Constitutional: Negative for chills and fever. Respiratory: Negative for cough and shortness of breath. Cardiovascular: Negative for chest pain. Gastrointestinal: Negative for nausea and vomiting. Musculoskeletal: Negative for arthralgias and joint swelling. Skin: Positive for wound. Neurological: Negative for headaches. 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 CURRENT MEDICATIONS       Previous Medications    LEVOTHYROXINE (SYNTHROID) 150 MCG TABLET    Take 150 mcg by mouth Daily    OLIVE LEAF EXTRACT 500 MG CAPS    Take  by mouth.       PREVIDENT 5000 BOOSTER PLUS 1.1 % PSTE    USE SMALL AMOUNT ONCE DAILY AS A TOOTHPASTE    SF 5000 PLUS 1.1 % CREA        THERAPEUTIC MULTIVITAMIN-MINERALS (THERAGRAN-M) TABLET    Take 1 tablet by mouth daily. TRAZODONE (DESYREL) 100 MG TABLET    Take 1 tablet by mouth nightly       ALLERGIES     Patient is is allergic to haldol [haloperidol lactate] and vicodin [hydrocodone-acetaminophen]. Patients   There is no immunization history on file for this patient. FAMILY HISTORY     Patient's family history includes Cancer in his mother; Heart Disease in his mother; Stroke in his mother. SOCIAL HISTORY     Patient  reports that he has never smoked. He has never used smokeless tobacco. He reports current alcohol use. He reports that he does not use drugs. PHYSICAL EXAM     ED TRIAGE VITALS  BP: (!) 151/82, Temp: 97.8 °F (36.6 °C), Pulse: 80, Resp: 20, SpO2: 97 %,Estimated body mass index is 30.94 kg/m² as calculated from the following:    Height as of this encounter: 6' 2\" (1.88 m). Weight as of this encounter: 241 lb (109.3 kg). ,No LMP for male patient. Physical Exam  Vitals signs and nursing note reviewed. Constitutional:       General: He is not in acute distress. Appearance: He is well-developed. He is not diaphoretic. HENT:      Right Ear: Laceration present. Ears:     Eyes:      Conjunctiva/sclera:      Right eye: Right conjunctiva is not injected. Left eye: Left conjunctiva is not injected. Pupils: Pupils are equal.   Neck:      Musculoskeletal: Normal range of motion. Cardiovascular:      Rate and Rhythm: Normal rate and regular rhythm. Heart sounds: No murmur. Pulmonary:      Effort: Pulmonary effort is normal. No respiratory distress. Breath sounds: Normal breath sounds. Musculoskeletal:      Right knee: He exhibits normal range of motion. Left knee: He exhibits normal range of motion. Skin:     General: Skin is warm. Findings: No rash.    Neurological: Mental Status: He is alert and oriented to person, place, and time. Psychiatric:         Behavior: Behavior normal.          DIAGNOSTIC RESULTS     Labs:No results found for this visit on 03/29/21. IMAGING:    No orders to display         EKG:  none    URGENT CARE COURSE:     Vitals:    03/29/21 1346   BP: (!) 151/82   Pulse: 80   Resp: 20   Temp: 97.8 °F (36.6 °C)   TempSrc: Temporal   SpO2: 97%   Weight: 241 lb (109.3 kg)   Height: 6' 2\" (1.88 m)       Medications   lidocaine PF 1 % injection (has no administration in time range)   bacitracin zinc ointment 1 g (has no administration in time range)            PROCEDURES:  Lac Repair    Date/Time: 3/29/2021 2:27 PM  Performed by: EARL Isabel CNP  Authorized by: EARL Isabel CNP     Consent:     Consent obtained:  Verbal    Consent given by:  Patient    Risks discussed:  Infection and poor cosmetic result    Alternatives discussed:  No treatment and referral  Anesthesia (see MAR for exact dosages): Anesthesia method:  Local infiltration    Local anesthetic:  Lidocaine 1% w/o epi  Laceration details:     Location:  Ear    Ear location:  R ear    Length (cm):  1.5  Repair type:     Repair type:  Simple  Pre-procedure details:     Preparation:  Patient was prepped and draped in usual sterile fashion  Exploration:     Hemostasis achieved with:  Direct pressure    Contaminated: no    Treatment:     Area cleansed with:  Betadine    Amount of cleaning:  Standard    Irrigation solution:  Sterile saline    Irrigation volume:  10 ml    Irrigation method:  Syringe  Skin repair:     Repair method:  Sutures    Suture size:  5-0    Suture material:  Nylon    Number of sutures:  4  Approximation:     Approximation:  Close  Post-procedure details:     Dressing:  Antibiotic ointment and adhesive bandage    Patient tolerance of procedure: Tolerated well, no immediate complications        FINAL IMPRESSION      1.  Laceration of right ear, initial encounter          DISPOSITION/ PLAN       Wound was closed as described the procedure note above and I discussed with the patient wound care and signs of infection for which to monitor. Patient is advised to follow-up in the urgent care with his PCP for any signs of infection. He is instructed to return or follow-up with his PCP in 1 week for suture removal and is agreeable to plan as discussed.     PATIENT REFERRED TO:  Gilda Quevedo MD  [address] [place] [address] / [address] [place] [id number]      DISCHARGE MEDICATIONS:  New Prescriptions    No medications on file       Discontinued Medications    No medications on file       Current Discharge Medication List          EARL Fernando - CNP    (Please note that portions of this note were completed with a voice recognition program. Efforts were made to edit the dictations but occasionally words are mis-transcribed.)          EARL Fernando CNP  03/29/21 4093

## 2021-03-29 NOTE — ED TRIAGE NOTES
To room with c/o laceration to right ear that happened today at 1230 after scratching ear. \" I was bleeding a lot. \"

## 2021-03-31 ENCOUNTER — CARE COORDINATION (OUTPATIENT)
Dept: FAMILY MEDICINE CLINIC | Age: 66
End: 2021-03-31

## 2021-03-31 NOTE — CARE COORDINATION
Ambulatory Care Coordination  ED Follow Up  I left messages for him to call me and he did not return my calls.   FU appts/Provider:    Future Appointments   Date Time Provider Kvng Cain   2/21/2022  3:00 PM [facility]ARL Farah [facility] [address] [facility]

## 2021-04-05 ENCOUNTER — HOSPITAL ENCOUNTER (EMERGENCY)
Age: 66
Discharge: HOME | End: 2021-04-05
Payer: MEDICARE

## 2021-04-05 VITALS
SYSTOLIC BLOOD PRESSURE: 178 MMHG | RESPIRATION RATE: 16 BRPM | OXYGEN SATURATION: 98 % | DIASTOLIC BLOOD PRESSURE: 88 MMHG | HEART RATE: 91 BPM | TEMPERATURE: 97.8 F

## 2021-04-05 DIAGNOSIS — S01.311D LACERATION OF RIGHT EAR, SUBSEQUENT ENCOUNTER: Primary | ICD-10-CM

## 2021-04-05 DIAGNOSIS — Z48.02 ENCOUNTER FOR REMOVAL OF SUTURES: ICD-10-CM

## 2021-04-05 PROCEDURE — 99999 PR OFFICE/OUTPT VISIT,PROCEDURE ONLY: CPT | Performed by: NURSE PRACTITIONER

## 2021-04-05 PROCEDURE — 99211 OFF/OP EST MAY X REQ PHY/QHP: CPT

## 2021-04-05 ASSESSMENT — ENCOUNTER SYMPTOMS
NAUSEA: 0
COUGH: 0
SHORTNESS OF BREATH: 0
VOMITING: 0
SORE THROAT: 0

## 2021-04-05 NOTE — ED PROVIDER NOTES
[facility]  Urgent Care Encounter       CHIEF COMPLAINT       Chief Complaint   Patient presents with    Suture / Staple Removal       Nurses Notes reviewed and I agree except as noted in the HPI. HISTORY OF PRESENT ILLNESS   Tien Castrejon is a 63 y.o. male who presents for removal of sutures off of right ear on tragus area. Denies any drainange, fever, chills or any other concerns. Reports been taking medications as prescribed. The history is provided by the patient. REVIEW OF SYSTEMS     Review of Systems   Constitutional: Negative for chills and fever. HENT: Negative for congestion and sore throat. Respiratory: Negative for cough and shortness of breath. Cardiovascular: Negative for chest pain. Gastrointestinal: Negative for nausea and vomiting. Musculoskeletal: Negative for arthralgias and myalgias. Skin: Positive for wound (resolved). Negative for rash. Allergic/Immunologic: Negative for environmental allergies. Neurological: Negative for headaches. 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 CURRENT MEDICATIONS       Previous Medications    LEVOTHYROXINE (SYNTHROID) 150 MCG TABLET    Take 150 mcg by mouth Daily    OLIVE LEAF EXTRACT 500 MG CAPS    Take  by mouth. PREVIDENT 5000 BOOSTER PLUS 1.1 % PSTE    USE SMALL AMOUNT ONCE DAILY AS A TOOTHPASTE    THERAPEUTIC MULTIVITAMIN-MINERALS (THERAGRAN-M) TABLET    Take 1 tablet by mouth daily. TRAZODONE (DESYREL) 100 MG TABLET    Take 1 tablet by mouth nightly       ALLERGIES     Patient is is allergic to haldol [haloperidol lactate] and vicodin [hydrocodone-acetaminophen]. Patients   There is no immunization history on file for this patient. FAMILY HISTORY     Patient's family history includes Cancer in his mother; Heart Disease in his mother; Stroke in his mother. SOCIAL HISTORY     Patient  reports that he has never smoked. He has never used smokeless tobacco. He reports current alcohol use. He reports that he does not use drugs. PHYSICAL EXAM     ED TRIAGE VITALS  BP: (!) 178/88, Temp: 97.8 °F (36.6 °C), Pulse: 91, Resp: 16, SpO2: 98 %,Estimated body mass index is 30.94 kg/m² as calculated from the following:    Height as of 3/29/21: 6' 2\" (1.88 m). Weight as of 3/29/21: 241 lb (109.3 kg). ,No LMP for male patient. Physical Exam  Vitals signs and nursing note reviewed. Constitutional:       General: He is not in acute distress. Appearance: He is well-developed. He is not diaphoretic. HENT:      Right Ear: Laceration (repaired) present. Ears:     Eyes:      Conjunctiva/sclera:      Right eye: Right conjunctiva is not injected. Left eye: Left conjunctiva is not injected. Pupils: Pupils are equal.   Neck:      Musculoskeletal: Normal range of motion. Cardiovascular:      Rate and Rhythm: Normal rate and regular rhythm. Heart sounds: No murmur. Pulmonary:      Effort: Pulmonary effort is normal. No respiratory distress. Breath sounds: Normal breath sounds. Musculoskeletal:      Right knee: He exhibits normal range of motion. Left knee: He exhibits normal range of motion. Skin:     General: Skin is warm and dry.       Capillary Refill: Capillary refill takes less than 2 seconds. Findings: No rash. Neurological:      Mental Status: He is alert and oriented to person, place, and time. Psychiatric:         Behavior: Behavior normal.         DIAGNOSTIC RESULTS     Labs:No results found for this visit on 04/05/21. IMAGING:    No orders to display         EKG:  none    URGENT CARE COURSE:     Vitals:    04/05/21 1328   BP: (!) 178/88   Pulse: 91   Resp: 16   Temp: 97.8 °F (36.6 °C)   SpO2: 98%       Medications - No data to display         PROCEDURES:    Suture removal kit was was used to remove 4 sutures of the right ear tragus area. Edges are well approximated, no drainage redness or warmth noted. After suture removal, the site cleaned with Betadine and clean dressing applied. Patient tolerated procedure well. FINAL IMPRESSION      1. Laceration of right ear, subsequent encounter    2. Encounter for removal of sutures          DISPOSITION/ PLAN     Sutures removed. Patient tolerated well. Discussed with the patient that patient may resume routine washing of his ear. Patient instructed to avoid vigorous rubbing for the next few days. Instructed to continue using over-the-counter antibiotic ointment for the next few days. Patient agreeable to plan.        PATIENT REFERRED TO:  MD Alejandro Dale / [address] [id number]      DISCHARGE MEDICATIONS:  New Prescriptions    No medications on file       Discontinued Medications    SF 5000 PLUS 1.1 % CREA           Current Discharge Medication List          EARL Poon CNP    (Please note that portions of this note were completed with a voice recognition program. Efforts were made to edit the dictations but occasionally words are mis-transcribed.)           EARL Poon CNP  04/05/21 7907

## 2021-04-05 NOTE — ED NOTES
Pt. Released in stable condition, ambulated per self to private car. Instructed pt to follow-up with family doctor as needed for recheck or go directly to the emergency department for any concerns/worsening conditions. Pt. Verbalized understanding of instructions. No questions at this time.      Nanette Ganser, RN  04/05/21 2628

## 2021-05-28 DIAGNOSIS — F41.9 ANXIETY: ICD-10-CM

## 2021-05-28 RX ORDER — TRAZODONE HYDROCHLORIDE 100 MG/1
100 TABLET ORAL NIGHTLY
Qty: 90 TABLET | Refills: 0 | Status: SHIPPED | OUTPATIENT
Start: 2021-05-28

## 2021-05-28 NOTE — TELEPHONE ENCOUNTER
Jarad Gill called requesting a refill of their:    traZODone (DESYREL) 100 MG tablet QD    Send to [employer]

## 2021-05-28 NOTE — TELEPHONE ENCOUNTER
Date of last visit:  1/19/2021  Date of next visit:  Visit date not found    Requested Prescriptions     Pending Prescriptions Disp Refills    traZODone (DESYREL) 100 MG tablet 90 tablet 3     Sig: Take 1 tablet by mouth nightly

## 2021-09-30 ENCOUNTER — HOSPITAL ENCOUNTER (OUTPATIENT)
Age: 66
Discharge: HOME | End: 2021-09-30
Payer: MEDICARE

## 2021-09-30 PROCEDURE — U0005 INFEC AGEN DETEC AMPLI PROBE: HCPCS

## 2021-09-30 PROCEDURE — U0003 INFECTIOUS AGENT DETECTION BY NUCLEIC ACID (DNA OR RNA); SEVERE ACUTE RESPIRATORY SYNDROME CORONAVIRUS 2 (SARS-COV-2) (CORONAVIRUS DISEASE [COVID-19]), AMPLIFIED PROBE TECHNIQUE, MAKING USE OF HIGH THROUGHPUT TECHNOLOGIES AS DESCRIBED BY CMS-2020-01-R: HCPCS

## 2021-10-01 LAB
SARS-COV-2: NOT DETECTED
SOURCE: NORMAL

## 2022-02-21 ENCOUNTER — OFFICE VISIT (OUTPATIENT)
Dept: PULMONOLOGY | Age: 67
End: 2022-02-21
Payer: MEDICARE

## 2022-02-21 VITALS
DIASTOLIC BLOOD PRESSURE: 72 MMHG | OXYGEN SATURATION: 97 % | HEART RATE: 69 BPM | BODY MASS INDEX: 28.31 KG/M2 | SYSTOLIC BLOOD PRESSURE: 138 MMHG | WEIGHT: 220.6 LBS | TEMPERATURE: 98.7 F | HEIGHT: 74 IN

## 2022-02-21 DIAGNOSIS — G47.33 OBSTRUCTIVE SLEEP APNEA TREATED WITH BIPAP: Primary | ICD-10-CM

## 2022-02-21 DIAGNOSIS — Z72.821 POOR SLEEP HYGIENE: ICD-10-CM

## 2022-02-21 DIAGNOSIS — Z86.73 HISTORY OF CVA (CEREBROVASCULAR ACCIDENT): ICD-10-CM

## 2022-02-21 DIAGNOSIS — G47.00 INSOMNIA, UNSPECIFIED TYPE: ICD-10-CM

## 2022-02-21 PROCEDURE — 99214 OFFICE O/P EST MOD 30 MIN: CPT | Performed by: NURSE PRACTITIONER

## 2022-02-21 ASSESSMENT — ENCOUNTER SYMPTOMS
NAUSEA: 0
WHEEZING: 0
COUGH: 0
DIARRHEA: 0
VOMITING: 0
ABDOMINAL PAIN: 0
SHORTNESS OF BREATH: 0
EYES NEGATIVE: 1

## 2022-02-21 NOTE — PROGRESS NOTES
[facility]      Tawny Nuñez         [id number]  2/21/2022   Chief Complaint   Patient presents with    Follow-up     DAHIANA 1 year sleep follow up with Heather french        Pt of Dr. Lucrecia GUSMAN Download:   Original or initial AHI: 27.4     Date of initial study: 1/4/05      Compliant  100%     Noncompliant 0 %     PAP Type aircurve 10Level  10/14 cmh2o    Avg Hrs/Day 7:25  AHI: 1.9   Recorded compliance dates ,1/19/22-2/17/22  Machine/Mfg:   [x] ResMed    [] Respironics/Dreamstation   Interface:   [] Nasal    [] Nasal pillows   [x] FFM    Provider:      [] -MECHE     []Kimberly     [] Drew    [x] Guru Cuevas    [] Luz               [] [employer] [facility]      [] [employer]    [] [address]      [] Other    Neck Size: 18  Mallampati Mallampati 2  ESS:  6  SAQLI:80     Here is a scan of the most recent download:            Presentation:   Graham Gustafson presents for sleep medicine follow up for obstructive sleep apnea  Since the last visit, Graham Gustafson is not seeing any benefit of his hypersomnia on BiPAP despite compliant use and controlled AHI , has never felt refreshed in mornings since using dating back nicolási 2005 . Used to work night shifts for years. Trouble falling asleep, takes trazodone [facility] mg at [facility] with benefit. Feels some of his issues goes back to his work schedules   Still finds himself staying up late, not giving himself full 8 hours of sleep after taking trazodone   Does some driving of clients part time. Is not having trouble staying awake while driving  ESS 6   Is taking extra Vit D3 and calcium supplements along with Q10 and zinc     Equipment issues: The pressure is  acceptable, the mask is acceptable     Sleep issues:  Do you feel better? No  More rested? No   Better concentration? no    Progress History:   Since last visit any new medical issues? No  New ER or hospital visits? No  Any new or changes in medicines? No  Any new sleep medicines?  No    Review of Systems - Review of Systems   Constitutional: Positive for fatigue. Negative for activity change, appetite change, chills, fever and unexpected weight change. HENT: Negative. Eyes: Negative. Respiratory: Negative for cough, shortness of breath and wheezing. Cardiovascular: Negative for chest pain, palpitations and leg swelling. Gastrointestinal: Negative for abdominal pain, diarrhea, nausea and vomiting. Genitourinary: Negative. Musculoskeletal: Positive for arthralgias. Skin: Negative. Neurological: Positive for weakness. Hematological: Negative. Psychiatric/Behavioral: Positive for sleep disturbance. Physical Exam:    BMI:  Body mass index is 28.32 kg/m². Wt Readings from Last 3 Encounters:   02/21/22 220 lb 9.6 oz (100.1 kg)   03/29/21 241 lb (109.3 kg)   02/22/21 220 lb (99.8 kg)     Weight stable / unchanged over the past 1 year   Weight on original study in 2005 : 196 #   Vitals: /72 (Site: Left Upper Arm, Position: Sitting)   Pulse 69   Temp 98.7 °F (37.1 °C)   Ht 6' 2\" (1.88 m)   Wt 220 lb 9.6 oz (100.1 kg)   SpO2 97% Comment: on ra  BMI 28.32 kg/m²       Physical Exam  Vitals and nursing note reviewed. Constitutional:       Appearance: Normal appearance. He is overweight. HENT:      Head: Normocephalic and atraumatic. Mouth/Throat:      Pharynx: Oropharynx is clear. Eyes:      Conjunctiva/sclera: Conjunctivae normal.   Pulmonary:      Effort: Pulmonary effort is normal. No tachypnea, bradypnea or respiratory distress. Skin:     Findings: No erythema or rash. Neurological:      Mental Status: He is alert and oriented to person, place, and time. Psychiatric:         Attention and Perception: Attention normal.         Mood and Affect: Mood normal.         Speech: Speech normal.         Behavior: Behavior normal.         Thought Content:  Thought content normal.         Cognition and Memory: Cognition normal.         Judgment: Judgment normal. ASSESSMENT/DIAGNOSIS     Diagnosis Orders   1. Obstructive sleep apnea treated with BiPAP  DME Order for CPAP as OP   2. History of CVA (cerebrovascular accident)     3. Insomnia, unspecified type     4. Poor sleep hygiene            Plan   Do you need any equipment today? Yes - updated supply order placed to fax to DME for supplies    - Download reviewed and discussed with patient  - He  was advised to continue current positive airway pressure therapy with above described pressure. AHI well controlled. - He  advised to keep good compliance with current recommended pressure to get optimal results and clinical improvement  - Recommend 7-9 hours of sleep with PAP  - He was advised to call DME company regarding supplies if needed.   -continue Trazodone as prescribed by PCP, educated on taking earlier in the night and allowing full 8 hours of sleep   -educated on good sleep hygiene practices   -He call my office for earlier appointment if needed for worsening of sleep symptoms.   - He was instructed on weight loss  - Gerald Zhao was educated about my impression and plan. Patient verbalizes understanding  .   We will see Tirso De Paz back in: 1 year with download  -billing based on medical decision making   Information added by my medical assistant/LPN was reviewed today

## 2022-07-11 ENCOUNTER — HOSPITAL ENCOUNTER (EMERGENCY)
Age: 67
Discharge: HOME | End: 2022-07-11
Payer: MEDICARE

## 2022-07-11 VITALS
DIASTOLIC BLOOD PRESSURE: 68 MMHG | SYSTOLIC BLOOD PRESSURE: 120 MMHG | OXYGEN SATURATION: 94 % | TEMPERATURE: 99 F | RESPIRATION RATE: 16 BRPM | HEART RATE: 87 BPM

## 2022-07-11 DIAGNOSIS — U07.1 COVID-19 VIRUS INFECTION: Primary | ICD-10-CM

## 2022-07-11 LAB — SARS-COV-2, NAA: DETECTED

## 2022-07-11 PROCEDURE — 87635 SARS-COV-2 COVID-19 AMP PRB: CPT

## 2022-07-11 PROCEDURE — 99213 OFFICE O/P EST LOW 20 MIN: CPT | Performed by: EMERGENCY MEDICINE

## 2022-07-11 PROCEDURE — 99213 OFFICE O/P EST LOW 20 MIN: CPT

## 2022-07-11 ASSESSMENT — ENCOUNTER SYMPTOMS
SINUS PAIN: 0
COUGH: 1
RHINORRHEA: 1
NAUSEA: 0
SINUS PRESSURE: 0
DIARRHEA: 0
SHORTNESS OF BREATH: 0
VOMITING: 0
SORE THROAT: 0
ABDOMINAL PAIN: 0

## 2022-07-11 ASSESSMENT — PAIN - FUNCTIONAL ASSESSMENT: PAIN_FUNCTIONAL_ASSESSMENT: NONE - DENIES PAIN

## 2022-07-11 NOTE — Clinical Note
Tj Saucedo was seen and treated in our emergency department on 7/11/2022. COVID19 virus is suspected. Per the CDC guidelines we recommend home isolation until the following conditions are all met:    1. At least five days have passed since symptoms first appeared and/or had a close exposure,   2. After home isolation for five days, wearing a mask around others for the next five days,  3. At least 24 have passed since last fever without the use of fever-reducing medications and  4. Symptoms (eg cough, shortness of breath) have improved    If you have any questions or concerns, please don't hesitate to call.     He may return to work/school on 07/16/2022        EARL Gaines - CNP

## 2022-07-11 NOTE — ED PROVIDER NOTES
[facility]  Urgent Care Encounter       CHIEF COMPLAINT       Chief Complaint   Patient presents with    Concern For COVID-19       Nurses Notes reviewed and I agree except as noted in the HPI. HISTORY OF PRESENT ILLNESS   Kalie Ashley is a 61 y.o. male who presents for complaints of concerns for COVID-19. 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 Patient states that he has seasonal allergies and often has a cough. He states his cough does not seem any worse than normal but he has been monitoring his temperature at home and had 100.0 temperature last night. He has occasional cough. Sinus congestion. No shortness of breath. No chest pain. Patient reports that he had COVID-19 in 2020 but it was never confirmed by testing. Patient has not been vaccinated. HPI    REVIEW OF SYSTEMS     Review of Systems   Constitutional: Positive for fever. Negative for activity change and fatigue. HENT: Positive for congestion and rhinorrhea. Negative for sinus pressure, sinus pain and sore throat. Respiratory: Positive for cough. Negative for shortness of breath. Cardiovascular: Negative for chest pain. Gastrointestinal: Negative for abdominal pain, diarrhea, nausea and vomiting. Psychiatric/Behavioral: Negative for behavioral problems.        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 CURRENT MEDICATIONS       Discharge Medication List as of 7/11/2022  8:52 AM      CONTINUE these medications which have NOT CHANGED    Details   traZODone (DESYREL) 100 MG tablet Take 1 tablet by mouth nightly, Disp-90 tablet, R-0Normal      levothyroxine (SYNTHROID) 150 MCG tablet Take 150 mcg by mouth DailyHistorical Med      PREVIDENT 5000 BOOSTER PLUS 1.1 % PSTE USE SMALL AMOUNT ONCE DAILY AS A TOOTHPASTE, R-4, DAWHistorical Med      therapeutic multivitamin-minerals (THERAGRAN-M) tablet Take 1 tablet by mouth daily. Olive Leaf Extract 500 MG CAPS Take  by mouth. ALLERGIES     Patient is is allergic to haldol [haloperidol lactate] and vicodin [hydrocodone-acetaminophen]. Patients   There is no immunization history on file for this patient. FAMILY HISTORY     Patient's family history includes Cancer in his mother; Heart Disease in his mother; Stroke in his mother. SOCIAL HISTORY     Patient  reports that he has never smoked. He has never used smokeless tobacco. He reports current alcohol use. He reports that he does not use drugs. PHYSICAL EXAM     ED TRIAGE VITALS  BP: 120/68, Temp: 99 °F (37.2 °C), Heart Rate: 87, Resp: 16, SpO2: 94 %,Estimated body mass index is 28.32 kg/m² as calculated from the following:    Height as of 2/21/22: 6' 2\" (1.88 m). Weight as of 2/21/22: 220 lb 9.6 oz (100.1 kg). ,No LMP for male patient. Physical Exam  Constitutional:       General: He is not in acute distress. Appearance: He is normal weight. HENT:      Head: Normocephalic. Cardiovascular:      Rate and Rhythm: Normal rate. Pulses: Normal pulses. Heart sounds: Normal heart sounds. Pulmonary:      Effort: Pulmonary effort is normal. No respiratory distress. Breath sounds: Normal breath sounds.    Skin:     General: Skin is warm and dry. Neurological:      General: No focal deficit present. Mental Status: He is alert. Psychiatric:         Mood and Affect: Mood normal.         Behavior: Behavior normal.         DIAGNOSTIC RESULTS     Labs:  Results for orders placed or performed during the hospital encounter of 07/11/22   COVID-19, Rapid   Result Value Ref Range    SARS-CoV-2, ZAKIA DETECTED (AA) NOT DETECTED       IMAGING:    No orders to display         EKG:      URGENT CARE COURSE:     Vitals:    07/11/22 0832   BP: 120/68   Pulse: 87   Resp: 16   Temp: 99 °F (37.2 °C)   TempSrc: Infrared   SpO2: 94%       Medications - No data to display         PROCEDURES:  None    FINAL IMPRESSION      1. COVID-19 virus infection          DISPOSITION/ PLAN   Patient presents for what is determined to be COVID-19 viral infection. Patient is advised to distance from others according to [facility] guidelines. Off work according to [facility] guidelines. Drink plenty fluids. Over-the-counter medications as needed. Return for new or worsening symptoms.         PATIENT REFERRED TO:  Nate Pham MD  [address] [place] [address] [address] / [address] [place] [address]      DISCHARGE MEDICATIONS:  Discharge Medication List as of 7/11/2022  8:52 AM          Discharge Medication List as of 7/11/2022  8:52 AM          Discharge Medication List as of 7/11/2022  8:52 AM          EARL Hinds CNP    (Please note that portions of this note were completed with a voice recognition program. Efforts were made to edit the dictations but occasionally words are mis-transcribed.)          EARL Hinds CNP  07/11/22 4037

## 2022-07-11 NOTE — ED TRIAGE NOTES
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

## 2023-02-20 ENCOUNTER — OFFICE VISIT (OUTPATIENT)
Dept: PULMONOLOGY | Age: 68
End: 2023-02-20
Payer: COMMERCIAL

## 2023-02-20 VITALS
BODY MASS INDEX: 28.57 KG/M2 | SYSTOLIC BLOOD PRESSURE: 136 MMHG | TEMPERATURE: 97.8 F | HEART RATE: 88 BPM | WEIGHT: 222.6 LBS | OXYGEN SATURATION: 98 % | DIASTOLIC BLOOD PRESSURE: 80 MMHG | HEIGHT: 74 IN

## 2023-02-20 DIAGNOSIS — G47.33 OBSTRUCTIVE SLEEP APNEA TREATED WITH BIPAP: Primary | ICD-10-CM

## 2023-02-20 DIAGNOSIS — G47.00 INSOMNIA, UNSPECIFIED TYPE: ICD-10-CM

## 2023-02-20 DIAGNOSIS — Z72.821 POOR SLEEP HYGIENE: ICD-10-CM

## 2023-02-20 PROCEDURE — 1123F ACP DISCUSS/DSCN MKR DOCD: CPT | Performed by: NURSE PRACTITIONER

## 2023-02-20 PROCEDURE — 99214 OFFICE O/P EST MOD 30 MIN: CPT | Performed by: NURSE PRACTITIONER

## 2023-02-20 ASSESSMENT — ENCOUNTER SYMPTOMS
WHEEZING: 0
SHORTNESS OF BREATH: 0
COUGH: 0
VOMITING: 0
DIARRHEA: 0
EYES NEGATIVE: 1
NAUSEA: 0
ABDOMINAL PAIN: 0

## 2023-02-20 NOTE — PROGRESS NOTES
[facility]      Demian Nguyen         [id number]  2/20/2023   Chief Complaint   Patient presents with    Follow-up     1 year DAHIANA follow up with download. Pt of Dr. Kaitlin Dominguez    PAP Download:   Original or initial AHI: 27.4     Date of initial study: 1/4/05    Compliant  100%   Noncompliant 0%     PAP Type Spont Level  IPAP 50rkO58 EPAP [id number]   Avg Hrs/Day 8 hours 14 minutes  AHI: 0.5   Recorded compliance dates 1/17/23-2/15/23  Machine/Mfg:   [x] ResMed    [] Respironics/Dreamstation   Interface:   [] Nasal    [] Nasal pillows   [x] FFM      Provider:      [] -MECHE     []Kimberly     [] Drew    [x] Henrik London    [] Cainermans               [] [facility] [facility]      [] Adaptive    [] [address]      [x] Other: Online CPAP. com for supplies    Neck Size: 16.75 inches  Mallampati 2  ESS:  5  SAQLI: 70    Here is a scan of the most recent download:                  Presentation:   Imelda Milton presents for 1 yearsleep medicine follow up for obstructive sleep apnea  Since the last visit, Imelda Milton   Is up 2-4 x per night to urinate due to overactive bladder. C/o waking up un-refreshed. 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    C/o significant dry mouth - has adjusted humidity level with no benefit  Is taking trazodone 100 mg PO nightly . Wondering if this med is causing dry mouth   History of non hodgkin's lymphoma - in remission ,still follows with oncology     TSH - wnl 2019   Majority of his work hours before [employer] was 4p-12 am   Currently goes to bed at 10-11 pm , falls asleep fairly quickly , gets up at 6:30 am   Ambien caused bad taste in mouth     Equipment issues: The pressure is  acceptable, the mask is acceptable     Progress History:   Since last visit any new medical issues? No  Sleep Related Issues? Yes  New ER or hospital visits? No  Any new or changes in medicines? No  Any new sleep medicines? No    Review of Systems -   Review of Systems   Constitutional:  Positive for fatigue (morning fatigue). Negative for activity change, appetite change, chills, fever and unexpected weight change. HENT: Negative. Eyes: Negative. Respiratory:  Negative for cough, shortness of breath and wheezing. Cardiovascular:  Negative for chest pain, palpitations and leg swelling. Gastrointestinal:  Negative for abdominal pain, diarrhea, nausea and vomiting. Genitourinary: Negative. Musculoskeletal: Negative. Skin: Negative. Neurological: Negative. Hematological: Negative. Psychiatric/Behavioral: Negative. Physical Exam:    BMI:  Body mass index is 28.97 kg/m². Wt Readings from Last 3 Encounters:   02/20/23 222 lb 9.6 oz (101 kg)   02/21/22 220 lb 9.6 oz (100.1 kg)   03/29/21 241 lb (109.3 kg)     Weight stable / unchanged  Vitals: /80 (Site: Left Upper Arm, Position: Sitting, Cuff Size: Medium Adult)   Pulse 88   Temp 97.8 °F (36.6 °C) (Oral)   Ht 6' 1.5\" (1.867 m)   Wt 222 lb 9.6 oz (101 kg)   SpO2 98% Comment: Patient on room air  BMI 28.97 kg/m²       Physical Exam  Vitals and nursing note reviewed. Constitutional:       Appearance: Normal appearance. He is overweight. HENT:      Head: Normocephalic and atraumatic. Mouth/Throat:      Dentition: Has dentures. Pharynx: Oropharynx is clear. Eyes:      Conjunctiva/sclera: Conjunctivae normal.   Pulmonary:      Effort: Pulmonary effort is normal. No tachypnea, bradypnea or respiratory distress. Skin:     Findings: No erythema or rash. Neurological:      Mental Status: He is alert and oriented to person, place, and time. Psychiatric:         Attention and Perception: Attention normal.         Mood and Affect: Mood normal.         Speech: Speech normal.         Behavior: Behavior normal.         Thought Content:  Thought content normal.         Cognition and Memory: Cognition normal.         Judgment: Judgment normal.         ASSESSMENT/DIAGNOSIS     Diagnosis Orders   1. Obstructive sleep apnea treated with BiPAP  DME Order for CPAP as OP    Pulse oximetry, overnight      2. Insomnia, unspecified type  Pulse oximetry, overnight      3. Poor sleep hygiene                 Plan   Do you need any equipment today? Yes -printed rx for supplies, -handed to patient , he is not currently using a DME, gets supplies on line     - Download reviewed and discussed with patient, his sleep apnea is very well controlled. Try to cut trazodone in half to see if helps dry mouth    Try over the counter biotine mouth spray from pharmacy , keep at bedside with water. Schedule overnight oxygen testing to be done while wearing your BiPAP - will call with results   - He  was advised to continue current positive airway pressure therapy with above described pressure. - He  advised to keep good compliance with current recommended pressure to get optimal results and clinical improvement  - Recommend 7-9 hours of sleep with PAP  - He was advised to call DME company regarding supplies if needed.   -He call my office for earlier appointment if needed for worsening of sleep symptoms.   - He was instructed on weight loss  - Gurpreet Watson was educated about my impression and plan. Patient verbalizesunderstanding.   We will see Eyad Garces back in: 1 year with download    Information added by my medical assistant/LPN was reviewed today    billing based on medical decision making     EARL Rao-CNP   2/20/2023

## 2023-02-20 NOTE — PATIENT INSTRUCTIONS
Try to cut trazodone in half to see if helps dry mouth    Try over the counter biotine mouth spray from pharmacy , keep at bedside with water.      Schedule overnight oxygen testing to be done while wearing your BiPAP - will call with results

## 2023-02-24 ENCOUNTER — HOSPITAL ENCOUNTER (OUTPATIENT)
Dept: RESPIRATORY THERAPY | Age: 68
Discharge: HOME | End: 2023-02-24
Payer: COMMERCIAL

## 2023-02-24 DIAGNOSIS — G47.33 OBSTRUCTIVE SLEEP APNEA TREATED WITH BIPAP: ICD-10-CM

## 2023-02-24 DIAGNOSIS — G47.00 INSOMNIA, UNSPECIFIED TYPE: ICD-10-CM

## 2023-02-24 PROCEDURE — 94762 N-INVAS EAR/PLS OXIMTRY CONT: CPT

## 2023-02-24 NOTE — PROCEDURES
Instructions were given for an overnight nocturnal pulse oximetry study. The assigned GE number of the pulse oximetry was [id number], [address] [place] [address] Drive [id number], GBA [id number]. A log sheet was completed. Patient was instructed on documenting any events that occurred throughout the night on the log sheet. The procedure was explained to the learner(s). Patient understanding of the procedure was good. Patient does have a mean of transportation to bring back the study the next day. A patient task was placed in the patients chart for the  to download the nocturnal study and fax the results to the ordering provider for interpretation. The pulse oximetrys memory was cleared. Patient had no questions and was sent home with the pulse oximeter.

## 2023-11-06 ENCOUNTER — HOSPITAL ENCOUNTER (EMERGENCY)
Age: 68
Discharge: HOME | End: 2023-11-06
Payer: COMMERCIAL

## 2023-11-06 VITALS
HEART RATE: 84 BPM | WEIGHT: 215 LBS | DIASTOLIC BLOOD PRESSURE: 73 MMHG | OXYGEN SATURATION: 96 % | SYSTOLIC BLOOD PRESSURE: 148 MMHG | BODY MASS INDEX: 27.98 KG/M2 | TEMPERATURE: 98.5 F | RESPIRATION RATE: 18 BRPM

## 2023-11-06 DIAGNOSIS — J20.8 ACUTE VIRAL BRONCHITIS: Primary | ICD-10-CM

## 2023-11-06 PROCEDURE — 99213 OFFICE O/P EST LOW 20 MIN: CPT | Performed by: NURSE PRACTITIONER

## 2023-11-06 PROCEDURE — 99213 OFFICE O/P EST LOW 20 MIN: CPT

## 2023-11-06 RX ORDER — DEXTROMETHORPHAN HYDROBROMIDE AND PROMETHAZINE HYDROCHLORIDE 15; 6.25 MG/5ML; MG/5ML
5 SYRUP ORAL 4 TIMES DAILY PRN
Qty: 120 ML | Refills: 0 | Status: SHIPPED | OUTPATIENT
Start: 2023-11-06 | End: 2023-11-13

## 2023-11-06 RX ORDER — PREDNISONE 20 MG/1
TABLET ORAL
Qty: 10 TABLET | Refills: 0 | Status: SHIPPED | OUTPATIENT
Start: 2023-11-06 | End: 2023-11-12

## 2023-11-06 ASSESSMENT — ENCOUNTER SYMPTOMS
SHORTNESS OF BREATH: 0
COUGH: 1
SORE THROAT: 0
DIARRHEA: 0
EYE DISCHARGE: 0
RHINORRHEA: 0
EYE REDNESS: 0
TROUBLE SWALLOWING: 0
NAUSEA: 0
CHEST TIGHTNESS: 0
VOMITING: 0
WHEEZING: 0

## 2023-11-06 ASSESSMENT — PAIN - FUNCTIONAL ASSESSMENT: PAIN_FUNCTIONAL_ASSESSMENT: NONE - DENIES PAIN

## 2023-11-06 NOTE — ED PROVIDER NOTES
[address] [facility]  Urgent Care Encounter      CHIEF COMPLAINT       Chief Complaint   Patient presents with    Chest Congestion       Nurses Notes reviewed and I agree except as noted in the HPI. HISTORY OF PRESENT ILLNESS   Yesy Mcclellan is a 79 y.o. male who presents for evaluation of cough. Onset of symptoms 1 week ago, unchanged. Cough is intermittent, productive at times. Associated sinus or chest congestion. No fever, wheezing, chest pain, palpitations, shortness of breath. Cough is worse at nighttime. No improvement with homeopathic treatment. REVIEW OF SYSTEMS     Review of Systems   Constitutional:  Negative for chills, diaphoresis, fatigue and fever. HENT:  Positive for congestion. Negative for ear pain, rhinorrhea, sore throat and trouble swallowing. Eyes:  Negative for discharge and redness. Respiratory:  Positive for cough. Negative for chest tightness, shortness of breath and wheezing. Cardiovascular:  Negative for chest pain. Gastrointestinal:  Negative for diarrhea, nausea and vomiting. Genitourinary:  Negative for decreased urine volume. Musculoskeletal:  Negative for neck pain and neck stiffness. Skin:  Negative for rash. Neurological:  Negative for headaches. Hematological:  Negative for adenopathy. Psychiatric/Behavioral:  Negative for sleep disturbance. 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

## 2023-11-06 NOTE — ED TRIAGE NOTES
Naldo Stephenson arrives to room with complaint of  chest congestion, cough  symptoms started 1 weeks ago. Not sleeping at all at night due to cough    States his wife is into holistic meds and nothing is helping.

## 2024-03-25 ENCOUNTER — HOSPITAL ENCOUNTER (EMERGENCY)
Age: 69
Discharge: HOME | End: 2024-03-25
Payer: COMMERCIAL

## 2024-03-25 VITALS
RESPIRATION RATE: 18 BRPM | TEMPERATURE: 97.8 F | HEART RATE: 85 BPM | BODY MASS INDEX: 27.98 KG/M2 | OXYGEN SATURATION: 97 % | DIASTOLIC BLOOD PRESSURE: 81 MMHG | SYSTOLIC BLOOD PRESSURE: 148 MMHG | HEIGHT: 74 IN | WEIGHT: 218 LBS

## 2024-03-25 DIAGNOSIS — H61.22 IMPACTED CERUMEN OF LEFT EAR: Primary | ICD-10-CM

## 2024-03-25 PROCEDURE — 69209 REMOVE IMPACTED EAR WAX UNI: CPT

## 2024-03-25 PROCEDURE — 99213 OFFICE O/P EST LOW 20 MIN: CPT

## 2024-03-25 PROCEDURE — 99212 OFFICE O/P EST SF 10 MIN: CPT | Performed by: NURSE PRACTITIONER

## 2024-03-25 ASSESSMENT — PAIN - FUNCTIONAL ASSESSMENT: PAIN_FUNCTIONAL_ASSESSMENT: NONE - DENIES PAIN

## 2024-03-25 NOTE — ED PROVIDER NOTES
[facility] URGENT CARE  UrgentCare Encounter      CHIEFCOMPLAINT       Chief Complaint   Patient presents with    Ear Fullness       Nurses Notes reviewed and I agree except as noted in the HPI.  HISTORY OF PRESENT ILLNESS   Wilmer Chakraborty is a 68 y.o. male who presents to urgent care with complaints of left ear fullness.  He states that he typically has to get his ear irrigated approximately twice a year.  He cannot hear anything out of the left ear.  Denies pain.    REVIEW OF SYSTEMS     Review of Systems   HENT:  Positive for hearing loss.        PAST MEDICAL HISTORY         Diagnosis Date    Adenomatous colon polyp 2001    Colon polyp, hyperplastic 2002    CVA (cerebral vascular accident) ([facility]) 1993    DVT (deep venous thrombosis) ([facility])     Environmental allergies     Lymphomatoid granulomatosis ([facility]) 1993    Multinodular goiter 2002    Non Hodgkin's lymphoma ([facility]) 1993    DAHIANA treated with BiPAP     PE (pulmonary embolism) 2012    Reflux esophagitis 1998    Renal cyst 1996    bilateral    Shingles        SURGICAL HISTORY     Patient  has a past surgical history that includes other surgical history (1993); Brain Biopsy (1993); Lung biopsy (1993); Tonsillectomy; Rotator cuff repair (2012); Biceps tendon repair (2012); and Thyroid surgery (01/25/2021).    CURRENT MEDICATIONS       Discharge Medication List as of 3/25/2024  8:34 AM        CONTINUE these medications which have NOT CHANGED    Details   traZODone (DESYREL) 100 MG tablet Take 1 tablet by mouth nightly, Disp-90 tablet, R-0Normal      levothyroxine (SYNTHROID) 150 MCG tablet Take 150 mcg by mouth DailyHistorical Med      therapeutic multivitamin-minerals (THERAGRAN-M) tablet Take 1 tablet by mouth daily.        Olive Leaf Extract 500 MG CAPS Take  by mouth.               ALLERGIES     Patient is is allergic to haldol [haloperidol lactate] and vicodin [hydrocodone-acetaminophen].    FAMILY HISTORY     Patient'sfamily history includes

## 2024-03-25 NOTE — ED NOTES
Pt with complaints of bilateral ear fullness that started 3 days ago. States he tried peroxide and feels as if it helped a little bit. Denies any pain.     Erica Garduno LPN  03/25/24 0818

## 2025-03-19 ENCOUNTER — HOSPITAL ENCOUNTER (EMERGENCY)
Age: 70
Discharge: HOME | End: 2025-03-20
Attending: EMERGENCY MEDICINE
Payer: MEDICARE

## 2025-03-19 VITALS
BODY MASS INDEX: 28.23 KG/M2 | HEART RATE: 82 BPM | TEMPERATURE: 98.2 F | RESPIRATION RATE: 18 BRPM | WEIGHT: 220 LBS | DIASTOLIC BLOOD PRESSURE: 89 MMHG | HEIGHT: 74 IN | OXYGEN SATURATION: 98 % | SYSTOLIC BLOOD PRESSURE: 147 MMHG

## 2025-03-19 DIAGNOSIS — L01.00 IMPETIGO: Primary | ICD-10-CM

## 2025-03-19 PROCEDURE — 99283 EMERGENCY DEPT VISIT LOW MDM: CPT

## 2025-03-19 PROCEDURE — 6370000000 HC RX 637 (ALT 250 FOR IP): Performed by: EMERGENCY MEDICINE

## 2025-03-19 RX ORDER — MUPIROCIN 20 MG/G
OINTMENT TOPICAL 2 TIMES DAILY
Status: DISCONTINUED | OUTPATIENT
Start: 2025-03-20 | End: 2025-03-20 | Stop reason: HOSPADM

## 2025-03-19 RX ORDER — SULFAMETHOXAZOLE AND TRIMETHOPRIM 800; 160 MG/1; MG/1
1 TABLET ORAL ONCE
Status: COMPLETED | OUTPATIENT
Start: 2025-03-20 | End: 2025-03-19

## 2025-03-19 RX ORDER — SULFAMETHOXAZOLE AND TRIMETHOPRIM 800; 160 MG/1; MG/1
1 TABLET ORAL 2 TIMES DAILY
Qty: 14 TABLET | Refills: 0 | Status: SHIPPED | OUTPATIENT
Start: 2025-03-19 | End: 2025-03-26

## 2025-03-19 RX ORDER — MUPIROCIN CALCIUM 20 MG/G
CREAM TOPICAL
Qty: 30 G | Refills: 0 | Status: SHIPPED | OUTPATIENT
Start: 2025-03-19 | End: 2025-04-18

## 2025-03-19 RX ORDER — CEPHALEXIN 500 MG/1
500 CAPSULE ORAL 2 TIMES DAILY
Qty: 14 CAPSULE | Refills: 0 | Status: SHIPPED | OUTPATIENT
Start: 2025-03-19 | End: 2025-03-26

## 2025-03-19 RX ADMIN — SULFAMETHOXAZOLE AND TRIMETHOPRIM 1 TABLET: 800; 160 TABLET ORAL at 23:52

## 2025-03-19 RX ADMIN — CEPHALEXIN 500 MG: 250 CAPSULE ORAL at 23:52

## 2025-03-19 RX ADMIN — MUPIROCIN: 20 OINTMENT TOPICAL at 23:54

## 2025-03-19 ASSESSMENT — PAIN SCALES - GENERAL: PAINLEVEL_OUTOF10: 6

## 2025-03-19 ASSESSMENT — PAIN DESCRIPTION - LOCATION: LOCATION: LEG

## 2025-03-19 ASSESSMENT — PAIN - FUNCTIONAL ASSESSMENT: PAIN_FUNCTIONAL_ASSESSMENT: 0-10

## 2025-03-19 ASSESSMENT — PAIN DESCRIPTION - ORIENTATION: ORIENTATION: RIGHT

## 2025-03-20 NOTE — ED PROVIDER NOTES
cephALEXin (KEFLEX) capsule 500 mg (500 mg Oral Given 3/19/25 4877)       PROCEDURES: (None if blank)  Procedures:     CRITICAL CARE: (None if blank)    DISCHARGE PRESCRIPTIONS: (None if blank)  Discharge Medication List as of 3/19/2025 11:59 PM        START taking these medications    Details   cephALEXin (KEFLEX) 500 MG capsule Take 1 capsule by mouth 2 times daily for 7 days, Disp-14 capsule, R-0Normal      mupirocin (BACTROBAN) 2 % cream Apply pea sized amount to a Q-Tip and apply inside of nose twice daily for 5 Days.  Also apply small amount on toilet paper and apply to rectum after each bowel movement for 5 days., Disp-30 g, R-0, Normal      sulfamethoxazole-trimethoprim (BACTRIM DS;SEPTRA DS) 800-160 MG per tablet Take 1 tablet by mouth 2 times daily for 7 days, Disp-14 tablet, R-0Normal               FINAL IMPRESSION     Final diagnoses:   Impetigo     1. Impetigo        DISPOSITION / PLAN   DISPOSITION Decision To Discharge 03/19/2025 11:59:12 PM   DISPOSITION CONDITION Stable           OUTPATIENT FOLLOW UP THE PATIENT:  Jorge Rae MD  [address] [address]  [phone]    Schedule an appointment as soon as possible for a visit in 3 days          This transcription was electronically signed. Parts of this transcriptions may have been dictated by use of voice recognition software and electronically transcribed, and parts may have been transcribed with the assistance of an ED scribe. The transcription may contain errors not detected in proofreading. Please refer to my supervising physician's documentation if my documentation differs.    Electronically Signed: Summer Wheeler MD, 03/20/25, 4:08 AM

## 2025-03-20 NOTE — ED TRIAGE NOTES
Patient presents to the ed with c/o rash to the right lower leg. Pt states that it has been this severe x2 weeks. Pt states that every year when the weather changes that he gets a small spot on his calve that normally resolves with over the counter cream. Pt leg is red, swollen and has orange like scabs over entire lower leg. Pt rating pain 6/10 at this time, pt denies taking any medication for relief.

## 2025-03-20 NOTE — DISCHARGE INSTRUCTIONS
If no improvement in symptoms in 3 days follow-up with your PCP or you develop fever or worsening symptoms please return to the ED.  Take medications as prescribed and make sure to drink plenty of water.